# Patient Record
Sex: MALE | Race: WHITE | Employment: OTHER | URBAN - METROPOLITAN AREA
[De-identification: names, ages, dates, MRNs, and addresses within clinical notes are randomized per-mention and may not be internally consistent; named-entity substitution may affect disease eponyms.]

---

## 2017-10-09 ENCOUNTER — HOSPITAL ENCOUNTER (OUTPATIENT)
Dept: NON INVASIVE DIAGNOSTICS | Age: 62
Discharge: HOME | End: 2017-10-09
Attending: EMERGENCY MEDICINE | Admitting: EMERGENCY MEDICINE

## 2017-10-09 DIAGNOSIS — M25.561 ACUTE PAIN OF RIGHT KNEE: ICD-10-CM

## 2017-10-12 ENCOUNTER — OFFICE VISIT (OUTPATIENT)
Dept: ORTHOPEDIC SURGERY | Age: 62
End: 2017-10-12

## 2017-10-12 VITALS
HEIGHT: 70 IN | BODY MASS INDEX: 31.5 KG/M2 | HEART RATE: 85 BPM | DIASTOLIC BLOOD PRESSURE: 88 MMHG | WEIGHT: 220 LBS | SYSTOLIC BLOOD PRESSURE: 138 MMHG

## 2017-10-12 DIAGNOSIS — M65.331 TRIGGER MIDDLE FINGER OF RIGHT HAND: Primary | ICD-10-CM

## 2017-10-12 PROCEDURE — 99203 OFFICE O/P NEW LOW 30 MIN: CPT | Performed by: ORTHOPAEDIC SURGERY

## 2017-10-12 PROCEDURE — 20550 NJX 1 TENDON SHEATH/LIGAMENT: CPT | Performed by: ORTHOPAEDIC SURGERY

## 2017-10-12 RX ORDER — ATENOLOL 50 MG/1
100 TABLET ORAL DAILY
Refills: 1 | COMMUNITY
Start: 2017-07-19

## 2017-10-12 RX ORDER — METOPROLOL SUCCINATE 50 MG/1
TABLET, EXTENDED RELEASE ORAL
Refills: 6 | COMMUNITY
Start: 2017-08-14 | End: 2019-09-30 | Stop reason: ALTCHOICE

## 2017-10-12 RX ORDER — CHLORTHALIDONE 25 MG/1
TABLET ORAL
Refills: 6 | COMMUNITY
Start: 2017-08-14

## 2017-11-06 ENCOUNTER — OFFICE VISIT (OUTPATIENT)
Dept: ORTHOPEDIC SURGERY | Age: 62
End: 2017-11-06

## 2017-11-06 VITALS
BODY MASS INDEX: 31.5 KG/M2 | WEIGHT: 220 LBS | SYSTOLIC BLOOD PRESSURE: 164 MMHG | HEART RATE: 57 BPM | DIASTOLIC BLOOD PRESSURE: 107 MMHG | RESPIRATION RATE: 16 BRPM | HEIGHT: 70 IN

## 2017-11-06 DIAGNOSIS — M25.561 ACUTE PAIN OF RIGHT KNEE: ICD-10-CM

## 2017-11-06 DIAGNOSIS — M17.11 PRIMARY OSTEOARTHRITIS OF RIGHT KNEE: Primary | ICD-10-CM

## 2017-11-06 PROCEDURE — 99213 OFFICE O/P EST LOW 20 MIN: CPT | Performed by: ORTHOPAEDIC SURGERY

## 2017-11-06 NOTE — PROGRESS NOTES
CHIEF COMPLAINT: Right knee pain. History:   Cassie Lieva is a 58 y.o. White male self-referred for evaluation and treatment of right knee pain / injury. The patient complains of right knee pain. He does have remote histor yof bilateral knee surgeries. This is evaluated as a personal injury. There was a history of injury. He jumped up to celebrate a touchdown at a [employer] game and felt a pop. The next day, he had some pain. The pain began 1 month ago. The pain is located medial. He describes the symptoms as aching. He rates pain at 7/10. Symptoms improve with nothing, though he has tried no treatments. The symptoms are worse with stair climbing, kneeling, deep knee bending, getting up from a chair, walking / standing for prolonged periods of time. The knee has not given out or felt unstable. The patient can bend and straighten the knee fully. There is no swelling in the knee. There was not catching / locking of the knee. The patient has not had PT. The patient has not had an injection. The patient has not taken NSAIDs. The patient's occupation is retired / part-time with [de-identified]. Outside reports reviewed: none. Past Medical History:   Diagnosis Date    Hypertension        Past Surgical History:   Procedure Laterality Date    COLON SURGERY      KNEE SURGERY         History reviewed. No pertinent family history.     Social History     Social History    Marital status:      Spouse name: N/A    Number of children: N/A    Years of education: N/A     Social History Main Topics    Smoking status: Former Smoker    Smokeless tobacco: Never Used    Alcohol use Yes    Drug use: No    Sexual activity: Not Asked     Other Topics Concern    None     Social History Narrative    None       Current Outpatient Prescriptions   Medication Sig Dispense Refill    chlorthalidone (HYGROTON) 25 MG tablet TK 1 T PO D  6    metoprolol succinate (TOPROL XL) 50 MG extended release tablet TK 1 T PO D  6 Assessment:     Right knee osteoarthritis      Plan:     Natural history and expected course discussed. Questions answered. Ice prn. NSAIDs prn. Injection prn. Follow up prn.

## 2018-05-11 ENCOUNTER — OFFICE VISIT (OUTPATIENT)
Dept: ORTHOPEDIC SURGERY | Age: 63
End: 2018-05-11

## 2018-05-11 VITALS
WEIGHT: 220 LBS | BODY MASS INDEX: 31.5 KG/M2 | SYSTOLIC BLOOD PRESSURE: 148 MMHG | RESPIRATION RATE: 12 BRPM | DIASTOLIC BLOOD PRESSURE: 96 MMHG | HEIGHT: 70 IN | HEART RATE: 67 BPM

## 2018-05-11 DIAGNOSIS — L03.115 CELLULITIS OF RIGHT KNEE: ICD-10-CM

## 2018-05-11 DIAGNOSIS — M25.561 RIGHT KNEE PAIN, UNSPECIFIED CHRONICITY: Primary | ICD-10-CM

## 2018-05-11 DIAGNOSIS — M17.11 PRIMARY OSTEOARTHRITIS OF RIGHT KNEE: ICD-10-CM

## 2018-05-11 PROCEDURE — 20610 DRAIN/INJ JOINT/BURSA W/O US: CPT | Performed by: PHYSICIAN ASSISTANT

## 2018-05-11 PROCEDURE — 99213 OFFICE O/P EST LOW 20 MIN: CPT | Performed by: PHYSICIAN ASSISTANT

## 2018-05-14 ENCOUNTER — OFFICE VISIT (OUTPATIENT)
Dept: ORTHOPEDIC SURGERY | Age: 63
End: 2018-05-14

## 2018-05-14 VITALS
RESPIRATION RATE: 16 BRPM | HEIGHT: 70 IN | DIASTOLIC BLOOD PRESSURE: 101 MMHG | WEIGHT: 220 LBS | SYSTOLIC BLOOD PRESSURE: 165 MMHG | BODY MASS INDEX: 31.5 KG/M2

## 2018-05-14 DIAGNOSIS — M76.899 QUADRICEPS TENDONITIS: Primary | ICD-10-CM

## 2018-05-14 PROCEDURE — 99214 OFFICE O/P EST MOD 30 MIN: CPT | Performed by: ORTHOPAEDIC SURGERY

## 2018-05-22 ENCOUNTER — HOSPITAL ENCOUNTER (OUTPATIENT)
Dept: OTHER | Age: 63
Discharge: HOME | End: 2018-05-31
Attending: ORTHOPAEDIC SURGERY | Admitting: ORTHOPAEDIC SURGERY

## 2018-05-29 ENCOUNTER — HOSPITAL ENCOUNTER (OUTPATIENT)
Dept: PHYSICAL THERAPY | Age: 63
Discharge: HOME | End: 2018-05-30
Admitting: ORTHOPAEDIC SURGERY

## 2018-05-29 NOTE — FLOWSHEET NOTE
Adduction BS 10 sec x 10  Added 5/22   Prone 10 x 3 #2 ^5/29   SLR+ 10 sec x 10  Added 5/29        Isometrics     Quad sets          Patellar Glides     Medial     Superior     Inferior          ROM     Sheet Pulls     Hang Weights          CKC     Calf raises 10 x 3 Added 5/22   Wall sits     Step ups     1 leg stand 15 sec x 5 Added 5/29   Squatting     CC TKE 10 x 3 4 plates Added 1/72   Balance     bridges          PRE     Extension 10 x 3 #2 ^5/29   Flexion 10 x 3  Added 5/29        Quantum machines     Leg press      Leg extension     Leg curl          Manual interventions                 Therapeutic Exercise and NMR EXR  [x] (84688) Provided verbal/tactile cueing for activities related to strengthening, flexibility, endurance, ROM for improvements in LE, proximal hip, and core control with self care, mobility, lifting, ambulation.  [] (36678) Provided verbal/tactile cueing for activities related to improving balance, coordination, kinesthetic sense, posture, motor skill, proprioception  to assist with LE, proximal hip, and core control in self care, mobility, lifting, ambulation and eccentric single leg control.      NMR and Therapeutic Activities:    [] (34191 or 50441) Provided verbal/tactile cueing for activities related to improving balance, coordination, kinesthetic sense, posture, motor skill, proprioception and motor activation to allow for proper function of core, proximal hip and LE with self care and ADLs  [] (36978) Gait Re-education- Provided training and instruction to the patient for proper LE, core and proximal hip recruitment and positioning and eccentric body weight control with ambulation re-education including up and down stairs     Home Exercise Program:    [x] (07024) Reviewed/Progressed HEP activities related to strengthening, flexibility, endurance, ROM of core, proximal hip and LE for functional self-care, mobility, lifting and ambulation/stair navigation   []

## 2018-06-01 ENCOUNTER — HOSPITAL ENCOUNTER (OUTPATIENT)
Dept: OTHER | Age: 63
Discharge: HOME | End: 2018-06-30
Attending: ORTHOPAEDIC SURGERY | Admitting: ORTHOPAEDIC SURGERY

## 2018-06-05 ENCOUNTER — HOSPITAL ENCOUNTER (OUTPATIENT)
Dept: PHYSICAL THERAPY | Age: 63
Discharge: HOME | End: 2018-06-06
Admitting: ORTHOPAEDIC SURGERY

## 2018-06-05 NOTE — FLOWSHEET NOTE
[address] [facility] [address]  Phone: [phone]   WHV: [phone]    Physical Therapy Daily Treatment Note  Date:  2018    Patient Name:  Yas Torre    :  1955  MRN: [id number]  Restrictions/Precautions:    Medical/Treatment Diagnosis Information:  Diagnosis: Quadriceps tendonitis - M76.899  Treatment Diagnosis: Decreased strength;Decreased high-level IADLs;Decreased endurance; Insurance/Certification information:  PT Insurance Information: Med mutual  Physician Information:  Referring Practitioner: Dr. Dhaliwal Halifax of care signed (Y/N): Y    Date of Patient follow up with Physician:     G-Code (if applicable):      Date G-Code Applied:  18  PT G-Codes  Functional Assessment Tool Used: LEFS  Score: 15% limitation  Functional Limitation: Mobility: Walking and moving around  Mobility: Walking and Moving Around Current Status (): At least 1 percent but less than 20 percent impaired, limited or restricted  Mobility: Walking and Moving Around Goal Status (): 0 percent impaired, limited or restricted    Progress Note: []  Yes  [x]  No  Next due by: 2018     Latex Allergy:  [x]NO      []YES  Preferred Language for Healthcare:   [x]English       []other:    Visit # Insurance Allowable   3 30     Pain level:  0 /10      SUBJECTIVE:  States he has not had any pain except a \"ulysses horse\" in his L knee. He states he is doing his HEP everyday.           OBJECTIVE:   Observation:   Test measurements:      RESTRICTIONS/PRECAUTIONS: none     Exercises/Interventions:   Exercise/Equipment Resistance/Repetitions Other comments   Stretching     Hamstring - supine 30 sec x 5 Added    Towel Pull     Inclined Calf     Hip Flexion     ITB     Groin     Quad - prone 30 sec x 5 Added         SLR     Supine 10 x 3 #4 ^6/5   Abduction 10 x 3 #4 ^6/5   Adduction BS 10 sec x 10  Added    Prone 10 x 3 report working pain free. Progression Towards Functional goals:  [] Patient is progressing as expected towards functional goals listed. [] Progression is slowed due to complexities listed. [] Progression has been slowed due to co-morbidities. [x] Plan just implemented, too soon to assess goals progression  [] Other:     ASSESSMENT:      Treatment/Activity Tolerance:  [x] Patient tolerated treatment well [] Patient limited by fatique  [] Patient limited by pain  [] Patient limited by other medical complications  [] Other: Pt mercedes session. He demonstrated increased strength, noted by increased resistance. He mercedes the addition of leg press, mini squats, and step-ups. He required VCs to decrease anterior tibial translation with mini squats, but then was able to maintain proper form. He did report min pain by the end of the mini squat sets. 6/5    Patient education: Patient education on PT and plan of care including diagnosis, prognosis, treatment goals and options. Patient agrees with discussed POC and treatment and is aware of rehab process.  5/22    Prognosis: [x] Good [] Fair  [] Poor    Patient Requires Follow-up: [x] Yes  [] No    PLAN:   [] Continue per plan of care [] Alter current plan (see comments)  [x] Plan of care initiated [] Hold pending MD visit [] Discharge    Electronically signed by: Amee Parish PT, DPT

## 2018-06-12 ENCOUNTER — HOSPITAL ENCOUNTER (OUTPATIENT)
Dept: PHYSICAL THERAPY | Age: 63
Discharge: HOME | End: 2018-06-13
Admitting: ORTHOPAEDIC SURGERY

## 2018-06-12 NOTE — FLOWSHEET NOTE
[address] [facility] [address]  Phone: [phone]   TXO: [phone]    Physical Therapy Daily Treatment Note  Date:  2018    Patient Name:  Estefany Mays    :  1955  MRN: [id number]  Restrictions/Precautions:    Medical/Treatment Diagnosis Information:  Diagnosis: Quadriceps tendonitis - M76.899  Treatment Diagnosis: Decreased strength;Decreased high-level IADLs;Decreased endurance; Insurance/Certification information:  PT Insurance Information: Med mutual  Physician Information:  Referring Practitioner: Dr. William Neil of care signed (Y/N): Y    Date of Patient follow up with Physician:     G-Code (if applicable):      Date G-Code Applied:  18  PT G-Codes  Functional Assessment Tool Used: LEFS  Score: 15% limitation  Functional Limitation: Mobility: Walking and moving around  Mobility: Walking and Moving Around Current Status (): At least 1 percent but less than 20 percent impaired, limited or restricted  Mobility: Walking and Moving Around Goal Status (): 0 percent impaired, limited or restricted    Progress Note: []  Yes  [x]  No  Next due by: 2018     Latex Allergy:  [x]NO      []YES  Preferred Language for Healthcare:   [x]English       []other:    Visit # Insurance Allowable   4 30     Pain level:  5 /10     SUBJECTIVE:  States he is pretty sore today. He states he worked all weekend and is really sore.         OBJECTIVE:   Observation:   Test measurements:      RESTRICTIONS/PRECAUTIONS: none     Exercises/Interventions:   Exercise/Equipment Resistance/Repetitions Other comments   Stretching     Hamstring - supine 30 sec x 5 Added    Towel Pull     Inclined Calf     Hip Flexion     ITB     Groin     Quad - prone 30 sec x 5 Added         SLR     Supine 10 x 3 #4 ^6/5   Abduction 10 x 3 #4 ^6/5   Adduction BS 10 sec x 10  Added    Prone 10 x 3 #4 ^6/5   SLR+ 10 sec x 10 Added 5/29        Isometrics     Quad sets          ROM     Sheet Pulls     Hang Weights          CKC     Calf raises 10 x 3 Added 5/22   Wall sits     Step ups 1 leg stand Squatting CC TKE 10 x 3 4 plates Added 3/66   Balance     bridges     clamshells 10 x 3 Added 6/12        PRE     Extension 10 x 3 #4 ^6/5   Flexion 10 x 3  #4 ^6/5        Quantum machines     Leg press  10 x 3 #100 Added 6/5   Leg extension     Leg curl          Manual interventions                 Therapeutic Exercise and NMR EXR  [x] (86120) Provided verbal/tactile cueing for activities related to strengthening, flexibility, endurance, ROM for improvements in LE, proximal hip, and core control with self care, mobility, lifting, ambulation.  [] (88111) Provided verbal/tactile cueing for activities related to improving balance, coordination, kinesthetic sense, posture, motor skill, proprioception  to assist with LE, proximal hip, and core control in self care, mobility, lifting, ambulation and eccentric single leg control.      NMR and Therapeutic Activities:    [] (98233 or 50992) Provided verbal/tactile cueing for activities related to improving balance, coordination, kinesthetic sense, posture, motor skill, proprioception and motor activation to allow for proper function of core, proximal hip and LE with self care and ADLs  [] (25080) Gait Re-education- Provided training and instruction to the patient for proper LE, core and proximal hip recruitment and positioning and eccentric body weight control with ambulation re-education including up and down stairs     Home Exercise Program:    [x] (02421) Reviewed/Progressed HEP activities related to strengthening, flexibility, endurance, ROM of core, proximal hip and LE for functional self-care, mobility, lifting and ambulation/stair navigation   [] (33341)Reviewed/Progressed HEP activities related to improving balance, coordination, kinesthetic sense, posture, motor skill, proprioception of core, proximal hip and LE for self care, mobility, lifting, and ambulation/stair navigation      Manual Treatments:  PROM / STM / Oscillations-Mobs:  G-I, II, III, IV (PA's, Inf., Post.)  [] (76277) Provided manual therapy to mobilize LE, proximal hip and/or LS spine soft tissue/joints for the purpose of modulating pain, promoting relaxation,  increasing ROM, reducing/eliminating soft tissue swelling/inflammation/restriction, improving soft tissue extensibility and allowing for proper ROM for normal function with self care, mobility, lifting and ambulation. Modalities:  Declined 5/22    Charges:  Timed Code Treatment Minutes: 40'    Total Treatment Minutes: 48'        [] EVAL (LOW) 33841 (typically 20 minutes face-to-face)  [] EVAL (MOD) 26770 (typically 30 minutes face-to-face)  [] EVAL (HIGH) 53955 (typically 45 minutes face-to-face)  [] RE-EVAL   [x] AJ(56224) x  2   [] IONTO  [] NMR (29161) x      [] VASO  [] Manual (90625) x       [] Other:  [x] TA x       [] Mech Traction (14840)  [] ES(attended) (23655)      [] ES (un) (35769):     GOALS:   Patient stated goal: strength knees     Therapist goals for Patient:   Short Term Goals: To be achieved in: 2 weeks  1. Independent in HEP and progression per patient tolerance, in order to prevent re-injury. 2. Patient will have a decrease in pain to facilitate improvement in movement, function, and ADLs as indicated by Functional Deficits. Long Term Goals: To be achieved in: 6 weeks  1. Disability index score of 7% or less for the LEFS to assist with reaching prior level of function. 2. Patient will demonstrate an increase in right hip (flex, ABD, and ext) and knee (flex and ext) strength to 4+/5 to allow for proper functional mobility as indicated by patients Functional Deficits. 3. Patient will return to walking without increased symptoms or restriction. 4. Patient will report working pain free.      Progression Towards Functional goals:  [] Patient is progressing as expected towards functional goals listed. [] Progression is slowed due to complexities listed. [] Progression has been slowed due to co-morbidities. [x] Plan just implemented, too soon to assess goals progression  [] Other:     ASSESSMENT:      Treatment/Activity Tolerance:  [x] Patient tolerated treatment well [] Patient limited by fatique  [] Patient limited by pain  [] Patient limited by other medical complications  [] Other: Pt mercedes session well. Resistance was not changed today due to pt's soreness. He required min VCs to maintain quad activation with SLRs. He mercedes the addition of clamshells pain free. 6/12    Patient education: Patient education on PT and plan of care including diagnosis, prognosis, treatment goals and options. Patient agrees with discussed POC and treatment and is aware of rehab process.  5/22    Prognosis: [x] Good [] Fair  [] Poor    Patient Requires Follow-up: [x] Yes  [] No    PLAN:   [] Continue per plan of care [] Alter current plan (see comments)  [x] Plan of care initiated [] Hold pending MD visit [] Discharge    Electronically signed by: Makeda Villa PT, DPT

## 2018-06-19 ENCOUNTER — HOSPITAL ENCOUNTER (OUTPATIENT)
Dept: PHYSICAL THERAPY | Age: 63
Discharge: HOME | End: 2018-06-20
Admitting: ORTHOPAEDIC SURGERY

## 2018-06-19 NOTE — PLAN OF CARE
[address] [facility] [place] [address]  Phone: [phone]   YYU: [phone]     Physical Therapy Re-Certification Plan of Care    Dear Dr. Shay Duncan,    We had the pleasure of treating the following patient for physical therapy services at [address] [place] [address]. A summary of our findings can be found in the updated assessment below. This includes our plan of care. If you have any questions or concerns regarding these findings, please do not hesitate to contact me at the office phone number checked above. Thank you for the referral.     Physician Signature:________________________________Date:__________________  By signing above (or electronic signature), therapists plan is approved by physician      Overall Response to Treatment:   [x]Patient is responding well to treatment and improvement is noted with regards  to goals   []Patient should continue to improve in reasonable time if they continue HEP   []Patient has plateaued and is no longer responding to skilled PT intervention    []Patient is getting worse and would benefit from return to referring MD   []Patient unable to adhere to initial POC   [x]Other: Pt demonstrates improved hamstring flexibility, but continues to lack quad flexibility. He demonstrates improved R knee ext ROM and improved hamstring/ quad and hip ABD strength. Recommend pt continue HEP for 2 weeks and return for re-assessment of symptoms after returning to work and possible D/C.      Date range of previous POC Visits: 18 - 18    Total Visits: 5          Physical Therapy Daily Treatment Note  Date:  2018    Patient Name:  Waleska Woods    :  1955  MRN: [id number]  Restrictions/Precautions:    Medical/Treatment Diagnosis Information:  Diagnosis: Quadriceps tendonitis - T06.235  Treatment Diagnosis: Decreased strength;Decreased high-level IADLs;Decreased Added 5/29        Isometrics     Quad sets          CKC     Calf raises 10 x 3 Added 5/22   Wall sits     Step ups 10 x 3 Green + pink flex and lateral Added 6/5   1 leg stand 30 sec x 5 ^6/19   Squatting 10 x 3 Added 6/5   CC TKE 10 x 3 6 plates ^8/10   Balance     bridges     clamshells 10 x 3 Added 6/12        PRE     Extension 10 x 3 #4 ^6/5   Flexion 10 x 3  #4 ^6/5        Quantum machines     Leg press  10 x 3 #130 ^6/19   Leg extension     Leg curl          Manual interventions                 Therapeutic Exercise and NMR EXR  [x] (84502) Provided verbal/tactile cueing for activities related to strengthening, flexibility, endurance, ROM for improvements in LE, proximal hip, and core control with self care, mobility, lifting, ambulation.  [] (18013) Provided verbal/tactile cueing for activities related to improving balance, coordination, kinesthetic sense, posture, motor skill, proprioception  to assist with LE, proximal hip, and core control in self care, mobility, lifting, ambulation and eccentric single leg control.      NMR and Therapeutic Activities:    [] (69494 or 48355) Provided verbal/tactile cueing for activities related to improving balance, coordination, kinesthetic sense, posture, motor skill, proprioception and motor activation to allow for proper function of core, proximal hip and LE with self care and ADLs  [] (07358) Gait Re-education- Provided training and instruction to the patient for proper LE, core and proximal hip recruitment and positioning and eccentric body weight control with ambulation re-education including up and down stairs     Home Exercise Program:    [x] (09552) Reviewed/Progressed HEP activities related to strengthening, flexibility, endurance, ROM of core, proximal hip and LE for functional self-care, mobility, lifting and ambulation/stair navigation   [] (68429)Reviewed/Progressed HEP activities related to improving balance, coordination, kinesthetic sense, posture, motor Patient will report working pain free. - Progressing towards    Progression Towards Functional goals:  [x] Patient is progressing as expected towards functional goals listed. [] Progression is slowed due to complexities listed. [] Progression has been slowed due to co-morbidities. [] Plan just implemented, too soon to assess goals progression  [] Other:     ASSESSMENT:      Treatment/Activity Tolerance:  [x] Patient tolerated treatment well [] Patient limited by fatique  [] Patient limited by pain  [] Patient limited by other medical complications  [] Other: Pt demonstrates improved hamstring flexibility, but continues to lack quad flexibility. He demonstrates improved R knee ext ROM and improved hamstring/ quad and hip ABD strength. Recommend pt continue HEP for 2 weeks and return for re-assessment of symptoms after returning to work and possible D/C.   6/19    Patient education: Patient education on PT and plan of care including diagnosis, prognosis, treatment goals and options. Patient agrees with discussed POC and treatment and is aware of rehab process.  5/22    Prognosis: [x] Good [] Fair  [] Poor    Patient Requires Follow-up: [x] Yes  [] No    PLAN: 1x/ every other week for 4 weeks (6/19 - 7/17)  [x] Continue per plan of care [] Alter current plan (see comments)  [] Plan of care initiated [] Hold pending MD visit [] Discharge    Electronically signed by: Lay Lynn PT, DPT

## 2018-07-01 ENCOUNTER — HOSPITAL ENCOUNTER (OUTPATIENT)
Dept: OTHER | Age: 63
Discharge: HOME | End: 2018-07-31
Attending: ORTHOPAEDIC SURGERY | Admitting: ORTHOPAEDIC SURGERY

## 2018-07-02 ENCOUNTER — TELEPHONE (OUTPATIENT)
Dept: PHYSICAL THERAPY | Age: 63
End: 2018-07-02

## 2018-07-03 ENCOUNTER — TELEPHONE (OUTPATIENT)
Dept: PHYSICAL THERAPY | Age: 63
End: 2018-07-03

## 2018-07-13 ENCOUNTER — SURG/PROC ORDERS (OUTPATIENT)
Dept: ANESTHESIOLOGY | Age: 63
End: 2018-07-13

## 2018-07-13 RX ORDER — SODIUM CHLORIDE 0.9 % (FLUSH) 0.9 %
10 SYRINGE (ML) INJECTION EVERY 12 HOURS SCHEDULED
Status: CANCELLED | OUTPATIENT
Start: 2018-07-13 | End: 2019-07-13

## 2018-07-13 RX ORDER — SODIUM CHLORIDE 0.9 % (FLUSH) 0.9 %
10 SYRINGE (ML) INJECTION PRN
Status: CANCELLED | OUTPATIENT
Start: 2018-07-13 | End: 2019-07-13

## 2018-07-13 RX ORDER — SODIUM CHLORIDE 9 MG/ML
INJECTION, SOLUTION INTRAVENOUS CONTINUOUS
Status: CANCELLED | OUTPATIENT
Start: 2018-07-13 | End: 2019-07-13

## 2018-07-16 ENCOUNTER — HOSPITAL ENCOUNTER (OUTPATIENT)
Dept: ENDOSCOPY | Age: 63
Discharge: HOME | End: 2018-07-16
Attending: INTERNAL MEDICINE | Admitting: INTERNAL MEDICINE

## 2018-07-16 VITALS
HEIGHT: 70 IN | RESPIRATION RATE: 16 BRPM | BODY MASS INDEX: 31.88 KG/M2 | DIASTOLIC BLOOD PRESSURE: 76 MMHG | SYSTOLIC BLOOD PRESSURE: 126 MMHG | OXYGEN SATURATION: 97 % | HEART RATE: 55 BPM | WEIGHT: 222.66 LBS | TEMPERATURE: 97.2 F

## 2018-07-16 LAB
ANION GAP SERPL CALCULATED.3IONS-SCNC: 13 MMOL/L (ref 3–16)
BUN BLDV-MCNC: 17 MG/DL (ref 7–20)
CALCIUM SERPL-MCNC: 9.4 MG/DL (ref 8.3–10.6)
CHLORIDE BLD-SCNC: 98 MMOL/L (ref 99–110)
CO2: 30 MMOL/L (ref 21–32)
CREAT SERPL-MCNC: 1 MG/DL (ref 0.8–1.3)
GFR AFRICAN AMERICAN: >60
GFR NON-AFRICAN AMERICAN: >60
GLUCOSE BLD-MCNC: 132 MG/DL (ref 70–99)
POTASSIUM REFLEX MAGNESIUM: 3.7 MMOL/L (ref 3.5–5.1)
SODIUM BLD-SCNC: 141 MMOL/L (ref 136–145)

## 2018-07-16 PROCEDURE — 93010 ELECTROCARDIOGRAM REPORT: CPT | Performed by: INTERNAL MEDICINE

## 2018-07-16 RX ORDER — ONDANSETRON 2 MG/ML
4 INJECTION INTRAMUSCULAR; INTRAVENOUS
Status: ACTIVE | OUTPATIENT
Start: 2018-07-16 | End: 2018-07-16

## 2018-07-16 RX ORDER — SODIUM CHLORIDE 0.9 % (FLUSH) 0.9 %
10 SYRINGE (ML) INJECTION PRN
Status: DISCONTINUED | OUTPATIENT
Start: 2018-07-16 | End: 2018-07-17 | Stop reason: HOSPADM

## 2018-07-16 RX ORDER — PROMETHAZINE HYDROCHLORIDE 25 MG/ML
6.25 INJECTION, SOLUTION INTRAMUSCULAR; INTRAVENOUS
Status: ACTIVE | OUTPATIENT
Start: 2018-07-16 | End: 2018-07-16

## 2018-07-16 RX ORDER — SODIUM CHLORIDE 0.9 % (FLUSH) 0.9 %
10 SYRINGE (ML) INJECTION EVERY 12 HOURS SCHEDULED
Status: DISCONTINUED | OUTPATIENT
Start: 2018-07-16 | End: 2018-07-17 | Stop reason: HOSPADM

## 2018-07-16 RX ORDER — LABETALOL HYDROCHLORIDE 5 MG/ML
5 INJECTION, SOLUTION INTRAVENOUS EVERY 10 MIN PRN
Status: DISCONTINUED | OUTPATIENT
Start: 2018-07-16 | End: 2018-07-17 | Stop reason: HOSPADM

## 2018-07-16 RX ORDER — SODIUM CHLORIDE 9 MG/ML
INJECTION, SOLUTION INTRAVENOUS CONTINUOUS
Status: DISCONTINUED | OUTPATIENT
Start: 2018-07-16 | End: 2018-07-17 | Stop reason: HOSPADM

## 2018-07-16 RX ADMIN — SODIUM CHLORIDE: 9 INJECTION, SOLUTION INTRAVENOUS at 08:02

## 2018-07-16 ASSESSMENT — PAIN - FUNCTIONAL ASSESSMENT: PAIN_FUNCTIONAL_ASSESSMENT: 0-10

## 2018-07-16 ASSESSMENT — PAIN SCALES - GENERAL: PAINLEVEL_OUTOF10: 0

## 2018-07-16 NOTE — ANESTHESIA PRE-OP
[facility] Department of Anesthesiology  Pre-Anesthesia Evaluation/Consultation       Name:  Waleska Woods  : 1955  Age:  61 y.o. MRN:  [id number]  Date: 2018           Procedure (Scheduled):  Colonoscopy  Surgeon:  Dr. Ana Mcwilliams     No Known Allergies  Patient Active Problem List   Diagnosis    Polyp of sigmoid colon    Colon cancer ([address]    Trigger middle finger of right hand    Primary osteoarthritis of right knee     Past Medical History:   Diagnosis Date    Hypertension      Past Surgical History:   Procedure Laterality Date    COLON SURGERY      KNEE SURGERY       Social History   Substance Use Topics    Smoking status: Former Smoker    Smokeless tobacco: Never Used    Alcohol use Yes     Medications  Current Outpatient Prescriptions on File Prior to Encounter   Medication Sig Dispense Refill    chlorthalidone (HYGROTON) 25 MG tablet TK 1 T PO D  6    metoprolol succinate (TOPROL XL) 50 MG extended release tablet TK 1 T PO D  6    atenolol (TENORMIN) 25 MG tablet TK 4 TS PO D  1    atenolol (TENORMIN) 50 MG tablet        No current facility-administered medications on file prior to encounter.       Current Outpatient Prescriptions   Medication Sig Dispense Refill    chlorthalidone (HYGROTON) 25 MG tablet TK 1 T PO D  6    metoprolol succinate (TOPROL XL) 50 MG extended release tablet TK 1 T PO D  6    atenolol (TENORMIN) 25 MG tablet TK 4 TS PO D  1    atenolol (TENORMIN) 50 MG tablet        Current Facility-Administered Medications   Medication Dose Route Frequency Provider Last Rate Last Dose    0.9 % sodium chloride infusion   Intravenous Continuous Kyara Dwyer MD        famotidine (PEPCID) injection 20 mg  20 mg Intravenous Once Kyara Dwyer MD        sodium chloride flush 0.9 % injection 10 mL  10 mL Intravenous 2 times per day Kyara Dwyer MD        sodium chloride flush 0.9 % injection 10 Pulmonary:Negative Pulmonary ROS and normal exam                               Cardiovascular:  Exercise tolerance: good (>4 METS),   (+) hypertension:,       ECG reviewed  Rhythm: regular  Rate: normal           Beta Blocker:  Dose within 24 Hrs         Neuro/Psych:   Negative Neuro/Psych ROS              GI/Hepatic/Renal:   (+) bowel prep,           Endo/Other: Negative Endo/Other ROS                    Abdominal:   (+) obese,         Vascular: negative vascular ROS. Anesthesia Plan      MAC     ASA 2       Induction: intravenous. Anesthetic plan and risks discussed with patient and spouse. Plan discussed with CRNA. This pre-anesthesia assessment may be used as a history and physical.    DOS STAFF ADDENDUM:    Pt seen and examined, chart reviewed (including anesthesia, drug and allergy history). No interval changes to history and physical examination. Anesthetic plan, risks, benefits, alternatives, and personnel involved discussed with patient. Patient verbalized an understanding and agrees to proceed.       Zeny Houston MD  July 16, 2018  7:52 AM

## 2018-07-16 NOTE — OP NOTE
Careful circumferential examination of the mucosa in these areas demonstrated:    1. A 3 mm polyp on the IC valve removed completely with biopsy polypectomy. 2. A 3 mm polyp in the ascending colon removed completely with biopsy polypectomy. 3. A 3 mm polyp in the ascending colon removed completely with biopsy polypectomy. 4. A tattoo was present at 70 cm without any evidence of residual polyp. 5. A healthy appearing anastomosis was present at 20 cm without any stricturing or ulceration. The scope was then withdrawn into the rectum and retroflexed. The retroflexed view of the anal verge and rectum demonstrates normal rectum. The scope was straightened, the colon was decompressed and the scope was withdrawn from the patient. The patient tolerated the procedure well and was taken to the PACU in good condition. Estimated blood loss: < 5 CC. Impression:  See post-procedure diagnoses. Recommendations:  1. Await pathology results. 2. Recommend repeat Colonoscopy in 3 years for surveillance purposes. 3. The patient had biopsies taken today. The patient should call for results in 7 days if they have not heard from our office. Our number is [phone].     KARI Perez [address] and [address]  7/16/2018  [phone]

## 2018-07-16 NOTE — ANESTHESIA POST-OP
[facility] Department of Anesthesiology  Post-Anesthesia Note       Name:  Estefany Mays                                  Age:  61 y.o. MRN:  [id number]     Last Vitals & Oxygen Saturation: /76   Pulse 55   Temp 97.2 °F (36.2 °C) (Temporal)   Resp 16   Ht 5' 10\" (1.778 m)   Wt 222 lb 10.6 oz (101 kg)   SpO2 97%   BMI 31.95 kg/m²   No data found. Level of consciousness:  Awake, alert to baseline    Respiratory: Respirations easy, no distress. Stable. Cardiovascular: Hemodynamically stable. Hydration: Adequate. PONV: Adequately managed. Post-op pain: Adequately controlled. Post-op assessment: Tolerated anesthetic well without complication. Complications:  None.     Yousif Barron MD  July 16, 2018   3:57 PM

## 2018-07-17 LAB
EKG ATRIAL RATE: 56 BPM
EKG DIAGNOSIS: NORMAL
EKG P AXIS: 45 DEGREES
EKG P-R INTERVAL: 180 MS
EKG Q-T INTERVAL: 450 MS
EKG QRS DURATION: 84 MS
EKG QTC CALCULATION (BAZETT): 434 MS
EKG R AXIS: 39 DEGREES
EKG T AXIS: 35 DEGREES
EKG VENTRICULAR RATE: 56 BPM

## 2019-06-18 ENCOUNTER — OFFICE VISIT (OUTPATIENT)
Dept: ORTHOPEDIC SURGERY | Age: 64
End: 2019-06-18
Payer: COMMERCIAL

## 2019-06-18 VITALS
BODY MASS INDEX: 31.5 KG/M2 | WEIGHT: 220 LBS | SYSTOLIC BLOOD PRESSURE: 140 MMHG | DIASTOLIC BLOOD PRESSURE: 90 MMHG | RESPIRATION RATE: 16 BRPM | HEIGHT: 70 IN

## 2019-06-18 DIAGNOSIS — M25.562 LEFT KNEE PAIN, UNSPECIFIED CHRONICITY: Primary | ICD-10-CM

## 2019-06-18 PROCEDURE — 99214 OFFICE O/P EST MOD 30 MIN: CPT | Performed by: ORTHOPAEDIC SURGERY

## 2019-06-18 NOTE — PROGRESS NOTES
CHIEF COMPLAINT: Left knee pain. History:   Josue Ford is a 59 y.o. male self-referred for evaluation and treatment of left knee pain / injury. The patient complains of left knee pain. This is evaluated as a personal injury. There was not a history of injury. The pain began 6 months ago. The pain is located medial. He describes the symptoms as aching. He rates pain at 10/10. Symptoms improve with nothing, though he has tried no treatments. The symptoms are worse with standing, walking for long time. The knee has not given out or felt unstable. The patient can bend and straighten the knee fully. There is no swelling in the knee. There was not catching / locking of the knee. The patient has not had PT. The patient has not had an injection. The patient has not taken NSAIDs. The patient has not tried ice. 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 Outside reports reviewed: none. Past Medical History:   Diagnosis Date    Hypertension        Past Surgical History:   Procedure Laterality Date    COLON SURGERY      COLONOSCOPY  07/2018    Dr. Donovan Pettit         History reviewed. No pertinent family history. Social History     Socioeconomic History    Marital status:      Spouse name: None    Number of children: None    Years of education: None    Highest education level: None   Occupational History    None   Social Needs    Financial resource strain: None    Food insecurity:     Worry: None     Inability: None    Transportation needs:     Medical: None     Non-medical: None   Tobacco Use    Smoking status: Former Smoker    Smokeless tobacco: Never Used   Substance and Sexual Activity    Alcohol use:  Yes    Drug use: No    Sexual activity: None   Lifestyle    Physical activity:     Days per week: None     Minutes per session: None    Stress: None   Relationships    Social connections:     Talks on phone: None     Gets together: None     Attends Jain service: None     Active member of club or organization: None     Attends meetings of clubs or organizations: None     Relationship status: None    Intimate partner violence:     Fear of current or ex partner: None     Emotionally abused: None     Physically abused: None     Forced sexual activity: None   Other Topics Concern    None   Social History Narrative    None       Current Outpatient Medications   Medication Sig Dispense Refill    chlorthalidone (HYGROTON) 25 MG tablet TK 1 T PO D  6    metoprolol succinate (TOPROL XL) 50 MG extended release tablet TK 1 T PO D  6    atenolol (TENORMIN) 25 MG tablet TK 4 TS PO D  1    atenolol (TENORMIN) 50 MG tablet        No current facility-administered medications for this visit. No Known Allergies    Review of Systems:  Pertinent items are noted in HPI. 13 point review of systems from Patient History Form dated 6/18/19 and available in the patient's chart under the Media tab. Physical Examination:     Vital signs:   BP (!) 156/81   Resp 16   Ht 5' 10\" (1.778 m)   Wt 220 lb (99.8 kg)   BMI 31.57 kg/m²    General:  alert, appears stated age, cooperative and no distress   Gait:  Normal. The patient can bear weight on the injured extremity. Left Knee  Alignment:  neutral   ROM:  0 degrees extension to 120 degrees flexion   Bilateral knees   Crepitus:  patellar   Joint Tenderness:  medial joint line   Effusion:   0 cc   Patellar excursion:  2 of 4 quadrants    Patellar tilt test:  positive   Patellar facet tenderness:  positive medial   negative lateral   Trochlear tenderness:  negative   Anterior drawer test:  negative   Posterior drawer:   negative   Varus laxity at 30 degrees:  negative   Right knee: negative   Valgus laxity at 30 degrees:   negative   Right knee: negative     There is not any cellulitis, lymphedema or cutaneous lesions noted in the lower extremities.  Motor exam of the lower extremities show quadriceps, hamstrings, foot dorsiflexion and plantarflexion grossly intact. Sensation to both feet is grossly intact to light touch. The bilateral lower extremities are warm and well-perfused with brisk capillary refill. Imaging:  Left Knee X-Ray: 3 view x-rays of the knee, including bilateral AP and sunrise and lateral of the affected side were obtained and reviewed. No fracture. Severe medial compartment narrowing. Mild to moderate patellofemoral narrowing      Assessment:     Left knee osteoarthritis      Plan:     Natural history and expected course discussed. Questions answered. Ice prn. NSAIDs prn. The risks and benefits of an injection were discussed with the patient. The patient had full opportunity to ask questions and all were answered. The patient then provided verbal informed consent. The skin was then prepped with betadine solution and alcohol. Under aseptic conditions, the  left knee was injected with 4cc of 1% xylocaine, 4cc of 0.25% marcaine, and 1cc of Kenalog (40mg/ml). There were no immediate complications following the injection. The patient was advised of the possibility of injection site reaction and instructed to apply ice to the area and take NSAIDs if able. Follow up 3 months prn.

## 2019-08-05 ENCOUNTER — OFFICE VISIT (OUTPATIENT)
Dept: ORTHOPEDIC SURGERY | Age: 64
End: 2019-08-05
Payer: COMMERCIAL

## 2019-08-05 VITALS
BODY MASS INDEX: 31.5 KG/M2 | HEIGHT: 70 IN | DIASTOLIC BLOOD PRESSURE: 82 MMHG | TEMPERATURE: 97.7 F | WEIGHT: 220 LBS | SYSTOLIC BLOOD PRESSURE: 158 MMHG | HEART RATE: 60 BPM

## 2019-08-05 DIAGNOSIS — M17.12 ARTHRITIS OF LEFT KNEE: ICD-10-CM

## 2019-08-05 DIAGNOSIS — M17.12 ARTHRITIS OF LEFT KNEE: Primary | ICD-10-CM

## 2019-08-05 LAB
ALBUMIN SERPL-MCNC: 4.5 G/DL (ref 3.4–5)
ANION GAP SERPL CALCULATED.3IONS-SCNC: 14 MMOL/L (ref 3–16)
APTT: 30.7 SEC (ref 26–36)
BASOPHILS ABSOLUTE: 0.1 K/UL (ref 0–0.2)
BASOPHILS RELATIVE PERCENT: 1.2 %
BILIRUBIN URINE: NEGATIVE
BLOOD, URINE: ABNORMAL
BUN BLDV-MCNC: 17 MG/DL (ref 7–20)
CALCIUM SERPL-MCNC: 9.6 MG/DL (ref 8.3–10.6)
CHLORIDE BLD-SCNC: 96 MMOL/L (ref 99–110)
CLARITY: CLEAR
CO2: 30 MMOL/L (ref 21–32)
COLOR: YELLOW
CREAT SERPL-MCNC: 1 MG/DL (ref 0.8–1.3)
EOSINOPHILS ABSOLUTE: 0.4 K/UL (ref 0–0.6)
EOSINOPHILS RELATIVE PERCENT: 3 %
EPITHELIAL CELLS, UA: 0 /HPF (ref 0–5)
ESTIMATED AVERAGE GLUCOSE: 128.4 MG/DL
GFR AFRICAN AMERICAN: >60
GFR NON-AFRICAN AMERICAN: >60
GLUCOSE BLD-MCNC: 126 MG/DL (ref 70–99)
GLUCOSE URINE: NEGATIVE MG/DL
HBA1C MFR BLD: 6.1 %
HCT VFR BLD CALC: 45.7 % (ref 40.5–52.5)
HEMOGLOBIN: 16.1 G/DL (ref 13.5–17.5)
HYALINE CASTS: 0 /LPF (ref 0–8)
INR BLD: 1.07 (ref 0.86–1.14)
KETONES, URINE: NEGATIVE MG/DL
LEUKOCYTE ESTERASE, URINE: NEGATIVE
LYMPHOCYTES ABSOLUTE: 1.8 K/UL (ref 1–5.1)
LYMPHOCYTES RELATIVE PERCENT: 14.9 %
MCH RBC QN AUTO: 30.1 PG (ref 26–34)
MCHC RBC AUTO-ENTMCNC: 35.2 G/DL (ref 31–36)
MCV RBC AUTO: 85.4 FL (ref 80–100)
MICROSCOPIC EXAMINATION: YES
MONOCYTES ABSOLUTE: 1.1 K/UL (ref 0–1.3)
MONOCYTES RELATIVE PERCENT: 9.1 %
NEUTROPHILS ABSOLUTE: 8.5 K/UL (ref 1.7–7.7)
NEUTROPHILS RELATIVE PERCENT: 71.8 %
NITRITE, URINE: NEGATIVE
PDW BLD-RTO: 14.2 % (ref 12.4–15.4)
PH UA: 6.5 (ref 5–8)
PLATELET # BLD: 320 K/UL (ref 135–450)
PMV BLD AUTO: 7.8 FL (ref 5–10.5)
POTASSIUM SERPL-SCNC: 4 MMOL/L (ref 3.5–5.1)
PROTEIN UA: NEGATIVE MG/DL
PROTHROMBIN TIME: 12.2 SEC (ref 9.8–13)
RBC # BLD: 5.35 M/UL (ref 4.2–5.9)
RBC UA: 4 /HPF (ref 0–4)
SODIUM BLD-SCNC: 140 MMOL/L (ref 136–145)
SPECIFIC GRAVITY UA: 1.02 (ref 1–1.03)
TRANSFERRIN: 275 MG/DL (ref 200–360)
URINE REFLEX TO CULTURE: ABNORMAL
URINE TYPE: ABNORMAL
UROBILINOGEN, URINE: 1 E.U./DL
VITAMIN D 25-HYDROXY: 25.3 NG/ML
WBC # BLD: 11.9 K/UL (ref 4–11)
WBC UA: 0 /HPF (ref 0–5)

## 2019-08-05 PROCEDURE — 99214 OFFICE O/P EST MOD 30 MIN: CPT | Performed by: ORTHOPAEDIC SURGERY

## 2019-08-06 ENCOUNTER — HOSPITAL ENCOUNTER (OUTPATIENT)
Dept: PHYSICAL THERAPY | Age: 64
Setting detail: THERAPIES SERIES
Discharge: HOME | End: 2019-08-06
Payer: COMMERCIAL

## 2019-08-06 ENCOUNTER — TELEPHONE (OUTPATIENT)
Dept: ORTHOPEDIC SURGERY | Age: 64
End: 2019-08-06

## 2019-08-06 PROCEDURE — 97161 PT EVAL LOW COMPLEX 20 MIN: CPT | Performed by: PHYSICAL THERAPIST

## 2019-08-06 PROCEDURE — 97110 THERAPEUTIC EXERCISES: CPT | Performed by: PHYSICAL THERAPIST

## 2019-08-06 NOTE — PLAN OF CARE
(G60.9)     Pulmonary conditions   []Asthma (J45)  []Coughing   []COPD (J44.9)   Psychological Disorders  []Anxiety (F41.9)  []Depression (F32.9)   []Other:   []Other:          Barriers to/and or personal factors that will affect rehab potential:              []Age  []Sex              []Motivation/Lack of Motivation                        [x]Co-Morbidities              []Cognitive Function, education/learning barriers              []Environmental, home barriers              []profession/work barriers  []past PT/medical experience  []other:  Justification: Healing process may be delayed due to HTN and prior usage of tobacco.     Falls Risk Assessment (30 days):   [x] Falls Risk assessed and no intervention required.   [] Falls Risk assessed and Patient requires intervention due to being higher risk   TUG score (>12s at risk):     [] Falls education provided, including     ASSESSMENT:   Functional Impairments:     []Noted lumbar/proximal hip/LE joint hypomobility  []Decreased LE functional ROM   [x]Decreased core/proximal hip strength and neuromuscular control   [x]Decreased LE functional strength   []Reduced balance/proprioceptive control   []other:      Functional Activity Limitations (from functional questionnaire and intake)   [x]Reduced ability to tolerate prolonged functional positions   [x]Reduced ability or difficulty with changes of positions or transfers between positions   [x]Reduced ability to maintain good posture and demonstrate good body mechanics with sitting, bending, and lifting   []Reduced ability to sleep   []Reduced ability or tolerance with driving and/or computer work   [x]Reduced ability to perform lifting, carrying tasks   [x]Reduced ability to squat   []Reduced ability to forward bend   []Reduced ability to ambulate prolonged functional periods/distances/surfaces   [x]Reduced ability to ascend/descend stairs   [x]Reduced ability to run, hop, cut or jump   []other:    Participation

## 2019-08-06 NOTE — FLOWSHEET NOTE
[address] [facility] [address]                                                         Physical Therapy Daily Treatment Note  Date:  2019    Patient Name:  Martha Gibbs    :  1955  MRN: [id number]    Medical/Treatment Diagnosis Information:  · Diagnosis: M17.12 (ICD-10-CM) - Arthritis of left knee  · Treatment Diagnosis: M25.562 pain in left knee  Insurance/Certification information:  PT Insurance Information: Medical [place]  Physician Information:  Referring Practitioner: Amparo Cabot of [place] signed (Y/N):     Date of Patient follow up with Physician: L knee replacement 2 Oct 2019    Functional Scale:  2019   WOMAC: 47.9%    Progress Note: [x]  Yes   []   No  Next due by:  To do after surgery around 2 Oct 2019      Latex Allergy:  [x]  NO      []  YES  Preferred Language for Healthcare:   [x]English       []other:    Visit # Insurance Allowable   1 BMN     Pain level:  See eval     SUBJECTIVE:  See eval    OBJECTIVE: See eval   Observation:    Test measurements:      Flexibility L R Comment   Hamstring      Gastroc      ITB      Quad                ROM PROM AROM Overpressure Comment    L R L R L R    Flexion          Extension                                  Strength L R Comment   Quad      Hamstring      Gastroc      Hip flexor      Hip ABD                      Special Test Results/Comment   Meniscal Click    Crepitus    Flexion Test    Valgus Laxity    Varus Laxity    Lachmans    Drop Back    Homans            Girth L R   Mid Patella     Suprapatellar     5cm above     15cm above       Wells Clinical Decision Rule for DVT    Clinical Presentation  Possible Score  Clients Score    Active cancer (within 6 months of diagnosis or receiving palliative care)  1     Paralysis, paresis, or recent immobilization of lower extremity  1     Bedridden for more than 3 days or major surgery in the last 4 weeks  1 Localized tenderness in the center of the posterior calf, the popliteal space, or along the femoral vein in the anterior thigh/groin  1     Entire lower extremity swelling  1     Unilateral calf swelling (more than 3 cm larger than uninvolved side)  1     Unilateral pitting edema  1     Collateral superficial veins (nonvaricose)  1     An alternative diagnosis is as likely (or more likely) than DVT (e.g., cellulitis, postoperative swelling, calf strain)  -2     Total Points   -2     Key  · -2 to 0 Low probability of DVT 3% (95% confidence interval [CI] 1.7%-5.9%)  · 1 to 2 Moderate probability of DVT 17% (95% confidence interval [CI] 12%-23%)  · 3 or more High probability of DVT 75% (95% confidence interval [CI] 63%-84%)    Medical consultation is advised in the presence of low probability  Medical referral is required with moderate or high score. Kristian PS, Kingsley DR, Elsi J, et al: Value of assessment of pretest probability of deep-vein thrombosis in clinical management, Lancet 533:8441-8136, 1997.       RESTRICTIONS/PRECAUTIONS: HTN, history of colon cancer    Exercises/Interventions:     Exercise/Equipment Resistance/Repetitions Other comments   Stretching     Hamstring 10x:10    Hip Flexion     ITB     Grion     Quad     Inclined Calf     Towel Pull 5x:30         SLR     Supine 2x10    Prone x10    Abduction 2x10    Adducton x10    SLR+     Clams x10                   Isometrics     Quad sets 10x:10 propped   Hip Adduction     Hamstring                    Patellar Glides     Medial     Superior     Inferior          ROM     Sheet Pulls 10x:10    Wall Slides     Edge of bed     Flexionator     Extensionator     Hang Weights     Ankle Pumps                              CKC     Calf raises     Wall sits     Step ups     Step up and over     Lateral Step Downs               Mini squats     CC TKE          Lateral band walks     Side Planks     Half moon     Single leg flow          Biodex-balance

## 2019-08-15 ENCOUNTER — TELEPHONE (OUTPATIENT)
Dept: ORTHOPEDIC SURGERY | Age: 64
End: 2019-08-15

## 2019-08-15 DIAGNOSIS — M17.12 ARTHRITIS OF LEFT KNEE: Primary | ICD-10-CM

## 2019-08-15 PROCEDURE — MISCCOLD COLD THERAPY UNIT AND PAD: Performed by: ORTHOPAEDIC SURGERY

## 2019-08-17 NOTE — PROGRESS NOTES
to the left lower extremity. There is no numbness or tingling noted in the left lower extremity. Deep tendon reflexes are 0 to need 0 at the ankle of the left lower extremity. No other obvious cutaneous lesions lymphedema or cellulitic processes are noted in the left lower extremity. Examination of the left knee shows a mild effusion medial joint line tenderness to palpation -5 degrees of full extension to 130 degrees of flexion noted. There are no signs of instability or deep sepsis noted in the left knee. X-rays obtained in the past shows bilateral medial degenerative tricompartmental osteoarthritis. Impression 49-year-old male with Aliscylerenetta Mayern degenerative arthritis of the left knee. This patient has been treated conservatively and now wants to move towards total knee arthroplasty. We had a 35 minute face-to-face discussion of which greater than 50% of the time was spent in counseling with this patient concerning left robotic assisted total knee arthroplasty it's preoperative evaluation and its postoperative pain management and follow-up. We went over the surgical procedure risks and complications including bleeding, infection,  neurovascular injury, decreased range of motion, persistent pain, instability, DVT, pulmonary embolism, leg length inequality, change in mental status, and need for further surgical procedures. The patient understands this and we have answered all of their questions and concerns. We will start his preoperative evaluation and optimization and schedule after 9/18/2019. Follow-up in a preoperative shared medical appointment.

## 2019-08-20 ENCOUNTER — HOSPITAL ENCOUNTER (OUTPATIENT)
Dept: CT IMAGING | Age: 64
Discharge: HOME | End: 2019-08-20
Payer: COMMERCIAL

## 2019-08-20 DIAGNOSIS — M17.12 ARTHRITIS OF LEFT KNEE: ICD-10-CM

## 2019-08-20 PROCEDURE — 73700 CT LOWER EXTREMITY W/O DYE: CPT

## 2019-09-13 ENCOUNTER — TELEPHONE (OUTPATIENT)
Dept: ORTHOPEDICS UNIT | Age: 64
End: 2019-09-13

## 2019-09-16 ENCOUNTER — PREP FOR PROCEDURE (OUTPATIENT)
Dept: ORTHOPEDICS UNIT | Age: 64
End: 2019-09-16

## 2019-09-16 RX ORDER — OXYCODONE HYDROCHLORIDE 5 MG/1
10 TABLET ORAL ONCE
Status: CANCELLED | OUTPATIENT
Start: 2019-09-16 | End: 2019-09-16

## 2019-09-16 RX ORDER — CELECOXIB 200 MG/1
200 CAPSULE ORAL ONCE
Status: CANCELLED | OUTPATIENT
Start: 2019-09-16 | End: 2019-09-16

## 2019-09-23 ENCOUNTER — PREP FOR PROCEDURE (OUTPATIENT)
Dept: ORTHOPEDIC SURGERY | Age: 64
End: 2019-09-23

## 2019-09-23 DIAGNOSIS — M17.12 ARTHRITIS OF LEFT KNEE: Primary | ICD-10-CM

## 2019-09-25 ENCOUNTER — HOSPITAL ENCOUNTER (OUTPATIENT)
Dept: PREADMISSION TESTING | Age: 64
Discharge: HOME | End: 2019-09-29
Payer: COMMERCIAL

## 2019-09-25 DIAGNOSIS — M17.12 ARTHRITIS OF LEFT KNEE: ICD-10-CM

## 2019-09-25 LAB
ABO/RH: NORMAL
ALBUMIN SERPL-MCNC: 4.4 G/DL (ref 3.4–5)
ANION GAP SERPL CALCULATED.3IONS-SCNC: 15 MMOL/L (ref 3–16)
ANTIBODY SCREEN: NORMAL
APTT: 33 SEC (ref 26–36)
BASOPHILS ABSOLUTE: 0.1 K/UL (ref 0–0.2)
BASOPHILS RELATIVE PERCENT: 1.1 %
BILIRUBIN URINE: NEGATIVE
BLOOD, URINE: NEGATIVE
BUN BLDV-MCNC: 19 MG/DL (ref 7–20)
CALCIUM SERPL-MCNC: 9.8 MG/DL (ref 8.3–10.6)
CHLORIDE BLD-SCNC: 97 MMOL/L (ref 99–110)
CLARITY: CLEAR
CO2: 28 MMOL/L (ref 21–32)
COLOR: YELLOW
CREAT SERPL-MCNC: 1 MG/DL (ref 0.8–1.3)
EKG ATRIAL RATE: 52 BPM
EKG DIAGNOSIS: NORMAL
EKG P AXIS: 51 DEGREES
EKG P-R INTERVAL: 188 MS
EKG Q-T INTERVAL: 450 MS
EKG QRS DURATION: 86 MS
EKG QTC CALCULATION (BAZETT): 418 MS
EKG R AXIS: 46 DEGREES
EKG T AXIS: 32 DEGREES
EKG VENTRICULAR RATE: 52 BPM
EOSINOPHILS ABSOLUTE: 0.3 K/UL (ref 0–0.6)
EOSINOPHILS RELATIVE PERCENT: 2 %
GFR AFRICAN AMERICAN: >60
GFR NON-AFRICAN AMERICAN: >60
GLUCOSE BLD-MCNC: 119 MG/DL (ref 70–99)
GLUCOSE URINE: NEGATIVE MG/DL
HCT VFR BLD CALC: 46 % (ref 40.5–52.5)
HEMOGLOBIN: 16.1 G/DL (ref 13.5–17.5)
INR BLD: 1.05 (ref 0.86–1.14)
KETONES, URINE: NEGATIVE MG/DL
LEUKOCYTE ESTERASE, URINE: NEGATIVE
LYMPHOCYTES ABSOLUTE: 2.5 K/UL (ref 1–5.1)
LYMPHOCYTES RELATIVE PERCENT: 18.6 %
MCH RBC QN AUTO: 29.8 PG (ref 26–34)
MCHC RBC AUTO-ENTMCNC: 35.1 G/DL (ref 31–36)
MCV RBC AUTO: 84.9 FL (ref 80–100)
MICROSCOPIC EXAMINATION: NORMAL
MONOCYTES ABSOLUTE: 1 K/UL (ref 0–1.3)
MONOCYTES RELATIVE PERCENT: 7.4 %
NEUTROPHILS ABSOLUTE: 9.4 K/UL (ref 1.7–7.7)
NEUTROPHILS RELATIVE PERCENT: 70.9 %
NITRITE, URINE: NEGATIVE
PDW BLD-RTO: 14.1 % (ref 12.4–15.4)
PH UA: 7.5 (ref 5–8)
PLATELET # BLD: 317 K/UL (ref 135–450)
PMV BLD AUTO: 7.5 FL (ref 5–10.5)
POTASSIUM SERPL-SCNC: 3.5 MMOL/L (ref 3.5–5.1)
PREALBUMIN: 19.6 MG/DL (ref 20–40)
PROTEIN UA: NEGATIVE MG/DL
PROTHROMBIN TIME: 12 SEC (ref 9.8–13)
RBC # BLD: 5.42 M/UL (ref 4.2–5.9)
SEDIMENTATION RATE, ERYTHROCYTE: 6 MM/HR (ref 0–20)
SODIUM BLD-SCNC: 140 MMOL/L (ref 136–145)
SPECIFIC GRAVITY UA: 1.02 (ref 1–1.03)
URINE REFLEX TO CULTURE: NORMAL
URINE TYPE: NORMAL
UROBILINOGEN, URINE: 1 E.U./DL
VITAMIN D 25-HYDROXY: 34.4 NG/ML
WBC # BLD: 13.3 K/UL (ref 4–11)

## 2019-09-25 PROCEDURE — 84134 ASSAY OF PREALBUMIN: CPT

## 2019-09-25 PROCEDURE — 93005 ELECTROCARDIOGRAM TRACING: CPT

## 2019-09-25 PROCEDURE — 85610 PROTHROMBIN TIME: CPT

## 2019-09-25 PROCEDURE — 86901 BLOOD TYPING SEROLOGIC RH(D): CPT

## 2019-09-25 PROCEDURE — 86900 BLOOD TYPING SEROLOGIC ABO: CPT

## 2019-09-25 PROCEDURE — 81003 URINALYSIS AUTO W/O SCOPE: CPT

## 2019-09-25 PROCEDURE — 86850 RBC ANTIBODY SCREEN: CPT

## 2019-09-25 PROCEDURE — 87641 MR-STAPH DNA AMP PROBE: CPT

## 2019-09-25 PROCEDURE — 80048 BASIC METABOLIC PNL TOTAL CA: CPT

## 2019-09-25 PROCEDURE — 83036 HEMOGLOBIN GLYCOSYLATED A1C: CPT

## 2019-09-25 PROCEDURE — 85730 THROMBOPLASTIN TIME PARTIAL: CPT

## 2019-09-25 PROCEDURE — 85025 COMPLETE CBC W/AUTO DIFF WBC: CPT

## 2019-09-25 PROCEDURE — 85652 RBC SED RATE AUTOMATED: CPT

## 2019-09-25 PROCEDURE — 82040 ASSAY OF SERUM ALBUMIN: CPT

## 2019-09-25 PROCEDURE — 93010 ELECTROCARDIOGRAM REPORT: CPT | Performed by: INTERNAL MEDICINE

## 2019-09-25 PROCEDURE — 82306 VITAMIN D 25 HYDROXY: CPT

## 2019-09-25 NOTE — CARE COORDINATION
DATE OF JET CLASS INTERVIEW: 9/25/19--here with his wife Andi Rodas? yes   CHOICES FOR HHC, DME VENDORS AND SKILLED/ REHAB FACILITIES PROVIDED TO PT DURING THIS INTERVIEW. DISCHARGE PLAN:/ INCLUDING WHO WILL BE STAYING WITH YOU AT HOME? Patient lives at home with his wife in a house. There are 2 steps to enter. He plans to return home at dc His wife will be at home with him and can assist with his care. LENGTH OF STAY HAS BEEN DISCUSSED WITH THE PT, APPROPRIATE TO HIS/ HER PROCEDURE. PT HAS BEEN INFORMED THAT THEY WILL BE DISCHARGED WHEN THE PHYSICIAN DEEMS THEM MEDICALLY READY. MOST PATIENTS CAN EXPECT  TO BE IN THE HOSPITAL ONE NIGHT AS AN OBSERVATION ONLY, OR 1-2 DAYS AS AN ADMISSION FOR THOSE WITH MEDICAL HEALTH  ISSUES/COMPLICATIONS. [facility] CARE:  [facility]  SNF/REHAB:  Denies need  DME:  Has a walker and a cane--denies other needs    PT AGREEABLE TO MEDS TO BEDS FROM [facility]. TRANSPORTATION:  His wife can transport at [facility] information for case management provided to pt. Will follow with therapies and social service.   Electronically signed by Miguel Daniel on 9/25/2019 at 12:31 PM

## 2019-09-26 ENCOUNTER — OFFICE VISIT (OUTPATIENT)
Dept: ORTHOPEDIC SURGERY | Age: 64
End: 2019-09-26

## 2019-09-26 DIAGNOSIS — M17.12 ARTHRITIS OF LEFT KNEE: Primary | ICD-10-CM

## 2019-09-26 LAB
ESTIMATED AVERAGE GLUCOSE: 119.8 MG/DL
HBA1C MFR BLD: 5.8 %
MRSA SCREEN RT-PCR: NORMAL

## 2019-09-26 PROCEDURE — 99999 PR OFFICE/OUTPT VISIT,PROCEDURE ONLY: CPT | Performed by: ORTHOPAEDIC SURGERY

## 2019-09-30 RX ORDER — ACETAMINOPHEN 160 MG
2000 TABLET,DISINTEGRATING ORAL DAILY
COMMUNITY

## 2019-09-30 RX ORDER — IBUPROFEN 200 MG
600 TABLET ORAL EVERY 8 HOURS PRN
Status: ON HOLD | COMMUNITY
End: 2019-10-02 | Stop reason: SDUPTHER

## 2019-10-01 ENCOUNTER — ANESTHESIA EVENT (OUTPATIENT)
Dept: OPERATING ROOM | Age: 64
DRG: 470 | End: 2019-10-01
Payer: COMMERCIAL

## 2019-10-02 ENCOUNTER — APPOINTMENT (OUTPATIENT)
Dept: GENERAL RADIOLOGY | Age: 64
DRG: 470 | End: 2019-10-02
Attending: ORTHOPAEDIC SURGERY
Payer: COMMERCIAL

## 2019-10-02 ENCOUNTER — HOSPITAL ENCOUNTER (INPATIENT)
Age: 64
LOS: 1 days | Discharge: HOME HEALTH | DRG: 470 | End: 2019-10-03
Attending: ORTHOPAEDIC SURGERY | Admitting: ORTHOPAEDIC SURGERY
Payer: COMMERCIAL

## 2019-10-02 ENCOUNTER — SURGICAL CONSULT (OUTPATIENT)
Dept: ORTHOPEDIC SURGERY | Age: 64
End: 2019-10-02

## 2019-10-02 ENCOUNTER — ANESTHESIA (OUTPATIENT)
Dept: OPERATING ROOM | Age: 64
DRG: 470 | End: 2019-10-02
Payer: COMMERCIAL

## 2019-10-02 ENCOUNTER — TELEPHONE (OUTPATIENT)
Dept: ORTHOPEDIC SURGERY | Age: 64
End: 2019-10-02

## 2019-10-02 VITALS — OXYGEN SATURATION: 98 % | DIASTOLIC BLOOD PRESSURE: 51 MMHG | SYSTOLIC BLOOD PRESSURE: 91 MMHG

## 2019-10-02 DIAGNOSIS — M17.12 ARTHRITIS OF LEFT KNEE: ICD-10-CM

## 2019-10-02 LAB
ABO/RH: NORMAL
ANTIBODY SCREEN: NORMAL
GLUCOSE BLD-MCNC: 100 MG/DL (ref 70–99)
GLUCOSE BLD-MCNC: 131 MG/DL (ref 70–99)
PERFORMED ON: ABNORMAL
PERFORMED ON: ABNORMAL

## 2019-10-02 PROCEDURE — 2720000010 HC SURG SUPPLY STERILE: Performed by: ORTHOPAEDIC SURGERY

## 2019-10-02 PROCEDURE — 6360000002 HC RX W HCPCS: Performed by: NURSE ANESTHETIST, CERTIFIED REGISTERED

## 2019-10-02 PROCEDURE — 94760 N-INVAS EAR/PLS OXIMETRY 1: CPT

## 2019-10-02 PROCEDURE — 6370000000 HC RX 637 (ALT 250 FOR IP): Performed by: ORTHOPAEDIC SURGERY

## 2019-10-02 PROCEDURE — 2580000003 HC RX 258: Performed by: ANESTHESIOLOGY

## 2019-10-02 PROCEDURE — 88305 TISSUE EXAM BY PATHOLOGIST: CPT

## 2019-10-02 PROCEDURE — 3700000000 HC ANESTHESIA ATTENDED CARE: Performed by: ORTHOPAEDIC SURGERY

## 2019-10-02 PROCEDURE — 3700000001 HC ADD 15 MINUTES (ANESTHESIA): Performed by: ORTHOPAEDIC SURGERY

## 2019-10-02 PROCEDURE — 86901 BLOOD TYPING SEROLOGIC RH(D): CPT

## 2019-10-02 PROCEDURE — 86850 RBC ANTIBODY SCREEN: CPT

## 2019-10-02 PROCEDURE — 97530 THERAPEUTIC ACTIVITIES: CPT

## 2019-10-02 PROCEDURE — 7100000001 HC PACU RECOVERY - ADDTL 15 MIN: Performed by: ORTHOPAEDIC SURGERY

## 2019-10-02 PROCEDURE — 7100000000 HC PACU RECOVERY - FIRST 15 MIN: Performed by: ORTHOPAEDIC SURGERY

## 2019-10-02 PROCEDURE — 6360000002 HC RX W HCPCS: Performed by: ORTHOPAEDIC SURGERY

## 2019-10-02 PROCEDURE — 97161 PT EVAL LOW COMPLEX 20 MIN: CPT

## 2019-10-02 PROCEDURE — 88311 DECALCIFY TISSUE: CPT

## 2019-10-02 PROCEDURE — 97110 THERAPEUTIC EXERCISES: CPT

## 2019-10-02 PROCEDURE — 2709999900 HC NON-CHARGEABLE SUPPLY: Performed by: ORTHOPAEDIC SURGERY

## 2019-10-02 PROCEDURE — G0378 HOSPITAL OBSERVATION PER HR: HCPCS

## 2019-10-02 PROCEDURE — 2060000000 HC ICU INTERMEDIATE R&B

## 2019-10-02 PROCEDURE — C1713 ANCHOR/SCREW BN/BN,TIS/BN: HCPCS | Performed by: ORTHOPAEDIC SURGERY

## 2019-10-02 PROCEDURE — 73560 X-RAY EXAM OF KNEE 1 OR 2: CPT

## 2019-10-02 PROCEDURE — 86900 BLOOD TYPING SEROLOGIC ABO: CPT

## 2019-10-02 PROCEDURE — 2500000003 HC RX 250 WO HCPCS: Performed by: ORTHOPAEDIC SURGERY

## 2019-10-02 PROCEDURE — 3600000005 HC SURGERY LEVEL 5 BASE: Performed by: ORTHOPAEDIC SURGERY

## 2019-10-02 PROCEDURE — 2580000003 HC RX 258: Performed by: ORTHOPAEDIC SURGERY

## 2019-10-02 PROCEDURE — 2500000003 HC RX 250 WO HCPCS: Performed by: NURSE ANESTHETIST, CERTIFIED REGISTERED

## 2019-10-02 PROCEDURE — 94150 VITAL CAPACITY TEST: CPT

## 2019-10-02 PROCEDURE — 0SRD0JA REPLACEMENT OF LEFT KNEE JOINT WITH SYNTHETIC SUBSTITUTE, UNCEMENTED, OPEN APPROACH: ICD-10-PCS | Performed by: ORTHOPAEDIC SURGERY

## 2019-10-02 PROCEDURE — 3600000015 HC SURGERY LEVEL 5 ADDTL 15MIN: Performed by: ORTHOPAEDIC SURGERY

## 2019-10-02 PROCEDURE — 97116 GAIT TRAINING THERAPY: CPT

## 2019-10-02 PROCEDURE — 8E0Y0CZ ROBOTIC ASSISTED PROCEDURE OF LOWER EXTREMITY, OPEN APPROACH: ICD-10-PCS | Performed by: ORTHOPAEDIC SURGERY

## 2019-10-02 PROCEDURE — C1776 JOINT DEVICE (IMPLANTABLE): HCPCS | Performed by: ORTHOPAEDIC SURGERY

## 2019-10-02 DEVICE — BASEPLATE TIB SZ 6 AP52MM ML77MM KNEE TRITANIUM 4 CRUCFRM: Type: IMPLANTABLE DEVICE | Site: KNEE | Status: FUNCTIONAL

## 2019-10-02 DEVICE — Z DUP USE 2373673 TRITANIUM SYMMETRIC METAL BACKED PATELLA S36X10: Type: IMPLANTABLE DEVICE | Site: KNEE | Status: FUNCTIONAL

## 2019-10-02 DEVICE — IMPLANTABLE DEVICE: Type: IMPLANTABLE DEVICE | Site: KNEE | Status: FUNCTIONAL

## 2019-10-02 DEVICE — IMPL KNEE TIB INSRT POSTR SZ 6 9MM: Type: IMPLANTABLE DEVICE | Site: KNEE | Status: FUNCTIONAL

## 2019-10-02 RX ORDER — DEXTROSE MONOHYDRATE 25 G/50ML
12.5 INJECTION, SOLUTION INTRAVENOUS PRN
Status: DISCONTINUED | OUTPATIENT
Start: 2019-10-02 | End: 2019-10-03

## 2019-10-02 RX ORDER — SODIUM CHLORIDE 9 MG/ML
INJECTION, SOLUTION INTRAVENOUS CONTINUOUS
Status: DISCONTINUED | OUTPATIENT
Start: 2019-10-02 | End: 2019-10-02

## 2019-10-02 RX ORDER — SODIUM CHLORIDE 450 MG/100ML
INJECTION, SOLUTION INTRAVENOUS CONTINUOUS
Status: DISCONTINUED | OUTPATIENT
Start: 2019-10-02 | End: 2019-10-03

## 2019-10-02 RX ORDER — ATENOLOL 25 MG/1
25 TABLET ORAL DAILY
Status: DISCONTINUED | OUTPATIENT
Start: 2019-10-03 | End: 2019-10-03 | Stop reason: HOSPADM

## 2019-10-02 RX ORDER — BUPIVACAINE HYDROCHLORIDE 7.5 MG/ML
INJECTION, SOLUTION INTRASPINAL PRN
Status: DISCONTINUED | OUTPATIENT
Start: 2019-10-02 | End: 2019-10-02

## 2019-10-02 RX ORDER — MIDAZOLAM HYDROCHLORIDE 1 MG/ML
INJECTION INTRAMUSCULAR; INTRAVENOUS PRN
Status: DISCONTINUED | OUTPATIENT
Start: 2019-10-02 | End: 2019-10-02 | Stop reason: SDUPTHER

## 2019-10-02 RX ORDER — OXYCODONE HYDROCHLORIDE 5 MG/1
5-10 TABLET ORAL EVERY 4 HOURS PRN
Qty: 40 TABLET | Refills: 0 | Status: SHIPPED | OUTPATIENT
Start: 2019-10-02 | End: 2019-10-09

## 2019-10-02 RX ORDER — FENTANYL CITRATE 50 UG/ML
25 INJECTION, SOLUTION INTRAMUSCULAR; INTRAVENOUS EVERY 5 MIN PRN
Status: DISCONTINUED | OUTPATIENT
Start: 2019-10-02 | End: 2019-10-02 | Stop reason: HOSPADM

## 2019-10-02 RX ORDER — SODIUM CHLORIDE 0.9 % (FLUSH) 0.9 %
10 SYRINGE (ML) INJECTION PRN
Status: DISCONTINUED | OUTPATIENT
Start: 2019-10-02 | End: 2019-10-02 | Stop reason: HOSPADM

## 2019-10-02 RX ORDER — ONDANSETRON 2 MG/ML
4 INJECTION INTRAMUSCULAR; INTRAVENOUS EVERY 6 HOURS PRN
Status: DISCONTINUED | OUTPATIENT
Start: 2019-10-02 | End: 2019-10-03 | Stop reason: HOSPADM

## 2019-10-02 RX ORDER — SODIUM CHLORIDE 0.9 % (FLUSH) 0.9 %
10 SYRINGE (ML) INJECTION PRN
Status: DISCONTINUED | OUTPATIENT
Start: 2019-10-02 | End: 2019-10-03 | Stop reason: HOSPADM

## 2019-10-02 RX ORDER — CELECOXIB 200 MG/1
200 CAPSULE ORAL EVERY 24 HOURS
Status: DISCONTINUED | OUTPATIENT
Start: 2019-10-03 | End: 2019-10-03 | Stop reason: HOSPADM

## 2019-10-02 RX ORDER — MORPHINE SULFATE 2 MG/ML
2 INJECTION, SOLUTION INTRAMUSCULAR; INTRAVENOUS
Status: DISCONTINUED | OUTPATIENT
Start: 2019-10-02 | End: 2019-10-03 | Stop reason: HOSPADM

## 2019-10-02 RX ORDER — OXYCODONE HYDROCHLORIDE AND ACETAMINOPHEN 5; 325 MG/1; MG/1
1 TABLET ORAL PRN
Status: DISCONTINUED | OUTPATIENT
Start: 2019-10-02 | End: 2019-10-02 | Stop reason: HOSPADM

## 2019-10-02 RX ORDER — BUPIVACAINE HYDROCHLORIDE 7.5 MG/ML
INJECTION, SOLUTION INTRASPINAL PRN
Status: DISCONTINUED | OUTPATIENT
Start: 2019-10-02 | End: 2019-10-02 | Stop reason: SDUPTHER

## 2019-10-02 RX ORDER — INSULIN GLARGINE 100 [IU]/ML
0.25 INJECTION, SOLUTION SUBCUTANEOUS NIGHTLY
Status: DISCONTINUED | OUTPATIENT
Start: 2019-10-02 | End: 2019-10-02

## 2019-10-02 RX ORDER — OXYCODONE HYDROCHLORIDE 10 MG/1
10 TABLET ORAL EVERY 4 HOURS PRN
Status: DISCONTINUED | OUTPATIENT
Start: 2019-10-02 | End: 2019-10-03 | Stop reason: HOSPADM

## 2019-10-02 RX ORDER — DOCUSATE SODIUM 100 MG/1
100 CAPSULE, LIQUID FILLED ORAL 2 TIMES DAILY
Status: DISCONTINUED | OUTPATIENT
Start: 2019-10-02 | End: 2019-10-03 | Stop reason: HOSPADM

## 2019-10-02 RX ORDER — OXYCODONE HYDROCHLORIDE 5 MG/1
5 TABLET ORAL EVERY 4 HOURS PRN
Status: DISCONTINUED | OUTPATIENT
Start: 2019-10-02 | End: 2019-10-03 | Stop reason: HOSPADM

## 2019-10-02 RX ORDER — CHLORTHALIDONE 25 MG/1
25 TABLET ORAL DAILY
Status: DISCONTINUED | OUTPATIENT
Start: 2019-10-03 | End: 2019-10-03 | Stop reason: HOSPADM

## 2019-10-02 RX ORDER — ONDANSETRON 2 MG/ML
4 INJECTION INTRAMUSCULAR; INTRAVENOUS
Status: DISCONTINUED | OUTPATIENT
Start: 2019-10-02 | End: 2019-10-02 | Stop reason: HOSPADM

## 2019-10-02 RX ORDER — FENTANYL CITRATE 50 UG/ML
INJECTION, SOLUTION INTRAMUSCULAR; INTRAVENOUS PRN
Status: DISCONTINUED | OUTPATIENT
Start: 2019-10-02 | End: 2019-10-02 | Stop reason: SDUPTHER

## 2019-10-02 RX ORDER — IBUPROFEN 200 MG
600 TABLET ORAL EVERY 8 HOURS PRN
Qty: 120 TABLET | Refills: 3
Start: 2019-10-02

## 2019-10-02 RX ORDER — NICOTINE POLACRILEX 4 MG
15 LOZENGE BUCCAL PRN
Status: DISCONTINUED | OUTPATIENT
Start: 2019-10-02 | End: 2019-10-03

## 2019-10-02 RX ORDER — ASPIRIN 81 MG/1
81 TABLET ORAL 2 TIMES DAILY
Qty: 28 TABLET | Refills: 0 | Status: SHIPPED | OUTPATIENT
Start: 2019-10-02 | End: 2021-10-15

## 2019-10-02 RX ORDER — SODIUM CHLORIDE 0.9 % (FLUSH) 0.9 %
10 SYRINGE (ML) INJECTION EVERY 12 HOURS SCHEDULED
Status: DISCONTINUED | OUTPATIENT
Start: 2019-10-02 | End: 2019-10-03 | Stop reason: HOSPADM

## 2019-10-02 RX ORDER — CELECOXIB 200 MG/1
200 CAPSULE ORAL ONCE
Status: COMPLETED | OUTPATIENT
Start: 2019-10-02 | End: 2019-10-02

## 2019-10-02 RX ORDER — OXYCODONE HYDROCHLORIDE AND ACETAMINOPHEN 5; 325 MG/1; MG/1
2 TABLET ORAL PRN
Status: DISCONTINUED | OUTPATIENT
Start: 2019-10-02 | End: 2019-10-02 | Stop reason: HOSPADM

## 2019-10-02 RX ORDER — DEXTROSE MONOHYDRATE 50 MG/ML
100 INJECTION, SOLUTION INTRAVENOUS PRN
Status: DISCONTINUED | OUTPATIENT
Start: 2019-10-02 | End: 2019-10-03

## 2019-10-02 RX ORDER — SODIUM CHLORIDE 0.9 % (FLUSH) 0.9 %
10 SYRINGE (ML) INJECTION EVERY 12 HOURS SCHEDULED
Status: DISCONTINUED | OUTPATIENT
Start: 2019-10-02 | End: 2019-10-02 | Stop reason: HOSPADM

## 2019-10-02 RX ORDER — OXYCODONE HYDROCHLORIDE 5 MG/1
10 TABLET ORAL ONCE
Status: COMPLETED | OUTPATIENT
Start: 2019-10-02 | End: 2019-10-02

## 2019-10-02 RX ADMIN — DOCUSATE SODIUM 100 MG: 100 CAPSULE, LIQUID FILLED ORAL at 20:54

## 2019-10-02 RX ADMIN — SODIUM CHLORIDE: 9 INJECTION, SOLUTION INTRAVENOUS at 10:51

## 2019-10-02 RX ADMIN — BUPIVACAINE HYDROCHLORIDE IN DEXTROSE 13.5 MG: 7.5 INJECTION, SOLUTION SUBARACHNOID at 11:03

## 2019-10-02 RX ADMIN — FENTANYL CITRATE 25 MCG: 50 INJECTION INTRAMUSCULAR; INTRAVENOUS at 10:55

## 2019-10-02 RX ADMIN — Medication 2 G: at 10:51

## 2019-10-02 RX ADMIN — SODIUM CHLORIDE: 4.5 INJECTION, SOLUTION INTRAVENOUS at 14:32

## 2019-10-02 RX ADMIN — OXYCODONE HYDROCHLORIDE 10 MG: 10 TABLET ORAL at 19:04

## 2019-10-02 RX ADMIN — SODIUM CHLORIDE: 4.5 INJECTION, SOLUTION INTRAVENOUS at 23:00

## 2019-10-02 RX ADMIN — CELECOXIB 200 MG: 200 CAPSULE ORAL at 09:26

## 2019-10-02 RX ADMIN — MIDAZOLAM 2 MG: 1 INJECTION INTRAMUSCULAR; INTRAVENOUS at 10:51

## 2019-10-02 RX ADMIN — OXYCODONE HYDROCHLORIDE 10 MG: 5 TABLET ORAL at 09:26

## 2019-10-02 RX ADMIN — FENTANYL CITRATE 25 MCG: 50 INJECTION INTRAMUSCULAR; INTRAVENOUS at 10:51

## 2019-10-02 RX ADMIN — Medication 2 G: at 18:48

## 2019-10-02 RX ADMIN — FENTANYL CITRATE 50 MCG: 50 INJECTION INTRAMUSCULAR; INTRAVENOUS at 10:54

## 2019-10-02 RX ADMIN — FAMOTIDINE 20 MG: 10 INJECTION, SOLUTION INTRAVENOUS at 20:54

## 2019-10-02 RX ADMIN — TRANEXAMIC ACID 1000 MG: 1 INJECTION, SOLUTION INTRAVENOUS at 10:30

## 2019-10-02 ASSESSMENT — PULMONARY FUNCTION TESTS
PIF_VALUE: 0
PIF_VALUE: 1
PIF_VALUE: 0
PIF_VALUE: 1
PIF_VALUE: 0
PIF_VALUE: 1
PIF_VALUE: 0
PIF_VALUE: 1
PIF_VALUE: 0
PIF_VALUE: 1
PIF_VALUE: 0

## 2019-10-02 ASSESSMENT — PAIN SCALES - GENERAL
PAINLEVEL_OUTOF10: 0
PAINLEVEL_OUTOF10: 7
PAINLEVEL_OUTOF10: 0

## 2019-10-02 ASSESSMENT — PAIN DESCRIPTION - PROGRESSION: CLINICAL_PROGRESSION: GRADUALLY WORSENING

## 2019-10-02 ASSESSMENT — PAIN DESCRIPTION - FREQUENCY: FREQUENCY: INTERMITTENT

## 2019-10-02 ASSESSMENT — PAIN DESCRIPTION - ONSET: ONSET: ON-GOING

## 2019-10-02 ASSESSMENT — PAIN - FUNCTIONAL ASSESSMENT
PAIN_FUNCTIONAL_ASSESSMENT: 0-10
PAIN_FUNCTIONAL_ASSESSMENT: PREVENTS OR INTERFERES SOME ACTIVE ACTIVITIES AND ADLS

## 2019-10-02 ASSESSMENT — PAIN DESCRIPTION - PAIN TYPE: TYPE: SURGICAL PAIN

## 2019-10-02 ASSESSMENT — PAIN DESCRIPTION - DESCRIPTORS
DESCRIPTORS: ACHING
DESCRIPTORS: ACHING

## 2019-10-02 ASSESSMENT — PAIN DESCRIPTION - ORIENTATION: ORIENTATION: LEFT

## 2019-10-02 ASSESSMENT — PAIN DESCRIPTION - LOCATION: LOCATION: KNEE

## 2019-10-03 VITALS
BODY MASS INDEX: 31.56 KG/M2 | WEIGHT: 220.46 LBS | HEART RATE: 59 BPM | RESPIRATION RATE: 17 BRPM | SYSTOLIC BLOOD PRESSURE: 126 MMHG | OXYGEN SATURATION: 97 % | DIASTOLIC BLOOD PRESSURE: 81 MMHG | TEMPERATURE: 98.2 F | HEIGHT: 70 IN

## 2019-10-03 LAB
ESTIMATED AVERAGE GLUCOSE: 122.6 MG/DL
GLUCOSE BLD-MCNC: 129 MG/DL (ref 70–99)
HBA1C MFR BLD: 5.9 %
HCT VFR BLD CALC: 40.3 % (ref 40.5–52.5)
HEMOGLOBIN: 14.2 G/DL (ref 13.5–17.5)
PERFORMED ON: ABNORMAL

## 2019-10-03 PROCEDURE — 96372 THER/PROPH/DIAG INJ SC/IM: CPT

## 2019-10-03 PROCEDURE — 97110 THERAPEUTIC EXERCISES: CPT

## 2019-10-03 PROCEDURE — 6370000000 HC RX 637 (ALT 250 FOR IP): Performed by: ORTHOPAEDIC SURGERY

## 2019-10-03 PROCEDURE — 6370000000 HC RX 637 (ALT 250 FOR IP): Performed by: NURSE PRACTITIONER

## 2019-10-03 PROCEDURE — 83036 HEMOGLOBIN GLYCOSYLATED A1C: CPT

## 2019-10-03 PROCEDURE — 6360000002 HC RX W HCPCS: Performed by: ORTHOPAEDIC SURGERY

## 2019-10-03 PROCEDURE — 2580000003 HC RX 258: Performed by: ORTHOPAEDIC SURGERY

## 2019-10-03 PROCEDURE — 97530 THERAPEUTIC ACTIVITIES: CPT

## 2019-10-03 PROCEDURE — 36415 COLL VENOUS BLD VENIPUNCTURE: CPT

## 2019-10-03 PROCEDURE — G0378 HOSPITAL OBSERVATION PER HR: HCPCS

## 2019-10-03 PROCEDURE — 97116 GAIT TRAINING THERAPY: CPT

## 2019-10-03 PROCEDURE — 97535 SELF CARE MNGMENT TRAINING: CPT

## 2019-10-03 PROCEDURE — 85014 HEMATOCRIT: CPT

## 2019-10-03 PROCEDURE — 85018 HEMOGLOBIN: CPT

## 2019-10-03 PROCEDURE — 97165 OT EVAL LOW COMPLEX 30 MIN: CPT

## 2019-10-03 RX ORDER — FAMOTIDINE 20 MG/1
20 TABLET, FILM COATED ORAL 2 TIMES DAILY
Status: DISCONTINUED | OUTPATIENT
Start: 2019-10-03 | End: 2019-10-03 | Stop reason: HOSPADM

## 2019-10-03 RX ADMIN — CELECOXIB 200 MG: 200 CAPSULE ORAL at 06:00

## 2019-10-03 RX ADMIN — ATENOLOL 25 MG: 25 TABLET ORAL at 08:06

## 2019-10-03 RX ADMIN — ENOXAPARIN SODIUM 30 MG: 30 INJECTION SUBCUTANEOUS at 06:00

## 2019-10-03 RX ADMIN — OXYCODONE HYDROCHLORIDE 10 MG: 10 TABLET ORAL at 08:06

## 2019-10-03 RX ADMIN — DOCUSATE SODIUM 100 MG: 100 CAPSULE, LIQUID FILLED ORAL at 08:06

## 2019-10-03 RX ADMIN — CHLORTHALIDONE 25 MG: 25 TABLET ORAL at 08:06

## 2019-10-03 RX ADMIN — SODIUM CHLORIDE, PRESERVATIVE FREE 10 ML: 5 INJECTION INTRAVENOUS at 08:08

## 2019-10-03 RX ADMIN — Medication 2 G: at 02:55

## 2019-10-03 RX ADMIN — FAMOTIDINE 20 MG: 20 TABLET, FILM COATED ORAL at 08:06

## 2019-10-03 ASSESSMENT — PAIN - FUNCTIONAL ASSESSMENT
PAIN_FUNCTIONAL_ASSESSMENT: ACTIVITIES ARE NOT PREVENTED
PAIN_FUNCTIONAL_ASSESSMENT: ACTIVITIES ARE NOT PREVENTED

## 2019-10-03 ASSESSMENT — PAIN DESCRIPTION - LOCATION
LOCATION: KNEE
LOCATION: KNEE

## 2019-10-03 ASSESSMENT — PAIN SCALES - GENERAL
PAINLEVEL_OUTOF10: 3
PAINLEVEL_OUTOF10: 7
PAINLEVEL_OUTOF10: 0
PAINLEVEL_OUTOF10: 0

## 2019-10-03 ASSESSMENT — PAIN DESCRIPTION - FREQUENCY
FREQUENCY: INTERMITTENT
FREQUENCY: INTERMITTENT

## 2019-10-03 ASSESSMENT — PAIN DESCRIPTION - PAIN TYPE
TYPE: ACUTE PAIN;SURGICAL PAIN
TYPE: ACUTE PAIN;SURGICAL PAIN

## 2019-10-03 ASSESSMENT — PAIN DESCRIPTION - ONSET
ONSET: ON-GOING
ONSET: ON-GOING

## 2019-10-03 ASSESSMENT — PAIN DESCRIPTION - PROGRESSION
CLINICAL_PROGRESSION: GRADUALLY IMPROVING
CLINICAL_PROGRESSION: GRADUALLY IMPROVING

## 2019-10-03 ASSESSMENT — PAIN DESCRIPTION - DESCRIPTORS
DESCRIPTORS: ACHING
DESCRIPTORS: ACHING

## 2019-10-03 ASSESSMENT — PAIN DESCRIPTION - ORIENTATION
ORIENTATION: LEFT
ORIENTATION: LEFT

## 2019-10-10 ENCOUNTER — TELEPHONE (OUTPATIENT)
Dept: ORTHOPEDICS UNIT | Age: 64
End: 2019-10-10

## 2019-10-11 ENCOUNTER — TELEPHONE (OUTPATIENT)
Dept: ORTHOPEDIC SURGERY | Age: 64
End: 2019-10-11

## 2019-10-17 ENCOUNTER — OFFICE VISIT (OUTPATIENT)
Dept: ORTHOPEDIC SURGERY | Age: 64
End: 2019-10-17

## 2019-10-17 VITALS — TEMPERATURE: 98.3 F

## 2019-10-17 DIAGNOSIS — Z96.652 HISTORY OF TOTAL KNEE REPLACEMENT, LEFT: Primary | ICD-10-CM

## 2019-10-17 PROCEDURE — 99024 POSTOP FOLLOW-UP VISIT: CPT | Performed by: PHYSICIAN ASSISTANT

## 2019-10-17 PROCEDURE — APPSS15 APP SPLIT SHARED TIME 0-15 MINUTES: Performed by: PHYSICIAN ASSISTANT

## 2019-10-21 ENCOUNTER — HOSPITAL ENCOUNTER (OUTPATIENT)
Dept: PHYSICAL THERAPY | Age: 64
Setting detail: THERAPIES SERIES
Discharge: HOME | End: 2019-10-21
Payer: COMMERCIAL

## 2019-10-21 DIAGNOSIS — M17.12 ARTHRITIS OF LEFT KNEE: ICD-10-CM

## 2019-10-21 PROCEDURE — 97016 VASOPNEUMATIC DEVICE THERAPY: CPT | Performed by: PHYSICAL THERAPIST

## 2019-10-21 PROCEDURE — 97110 THERAPEUTIC EXERCISES: CPT | Performed by: PHYSICAL THERAPIST

## 2019-10-21 PROCEDURE — 97530 THERAPEUTIC ACTIVITIES: CPT | Performed by: PHYSICAL THERAPIST

## 2019-10-21 PROCEDURE — 97164 PT RE-EVAL EST PLAN CARE: CPT | Performed by: PHYSICAL THERAPIST

## 2019-10-23 ENCOUNTER — HOSPITAL ENCOUNTER (OUTPATIENT)
Dept: PHYSICAL THERAPY | Age: 64
Setting detail: THERAPIES SERIES
Discharge: HOME | End: 2019-10-23
Payer: COMMERCIAL

## 2019-10-23 PROCEDURE — 97112 NEUROMUSCULAR REEDUCATION: CPT | Performed by: PHYSICAL THERAPIST

## 2019-10-23 PROCEDURE — 97110 THERAPEUTIC EXERCISES: CPT | Performed by: PHYSICAL THERAPIST

## 2019-10-23 PROCEDURE — 97530 THERAPEUTIC ACTIVITIES: CPT | Performed by: PHYSICAL THERAPIST

## 2019-10-29 ENCOUNTER — HOSPITAL ENCOUNTER (OUTPATIENT)
Dept: PHYSICAL THERAPY | Age: 64
Setting detail: THERAPIES SERIES
Discharge: HOME | End: 2019-10-29
Payer: COMMERCIAL

## 2019-10-29 PROCEDURE — 97530 THERAPEUTIC ACTIVITIES: CPT | Performed by: PHYSICAL THERAPIST

## 2019-10-29 PROCEDURE — 97110 THERAPEUTIC EXERCISES: CPT | Performed by: PHYSICAL THERAPIST

## 2019-10-29 PROCEDURE — 97112 NEUROMUSCULAR REEDUCATION: CPT | Performed by: PHYSICAL THERAPIST

## 2019-10-29 PROCEDURE — 97016 VASOPNEUMATIC DEVICE THERAPY: CPT | Performed by: PHYSICAL THERAPIST

## 2019-10-31 ENCOUNTER — HOSPITAL ENCOUNTER (OUTPATIENT)
Dept: PHYSICAL THERAPY | Age: 64
Setting detail: THERAPIES SERIES
Discharge: HOME | End: 2019-10-31
Payer: COMMERCIAL

## 2019-10-31 PROCEDURE — 97112 NEUROMUSCULAR REEDUCATION: CPT | Performed by: PHYSICAL THERAPIST

## 2019-10-31 PROCEDURE — 97110 THERAPEUTIC EXERCISES: CPT | Performed by: PHYSICAL THERAPIST

## 2019-10-31 PROCEDURE — 97530 THERAPEUTIC ACTIVITIES: CPT | Performed by: PHYSICAL THERAPIST

## 2019-10-31 PROCEDURE — 97140 MANUAL THERAPY 1/> REGIONS: CPT | Performed by: PHYSICAL THERAPIST

## 2019-10-31 PROCEDURE — 97016 VASOPNEUMATIC DEVICE THERAPY: CPT | Performed by: PHYSICAL THERAPIST

## 2019-11-05 ENCOUNTER — HOSPITAL ENCOUNTER (OUTPATIENT)
Dept: PHYSICAL THERAPY | Age: 64
Setting detail: THERAPIES SERIES
Discharge: HOME | End: 2019-11-05
Payer: COMMERCIAL

## 2019-11-05 PROCEDURE — 97530 THERAPEUTIC ACTIVITIES: CPT | Performed by: PHYSICAL THERAPIST

## 2019-11-05 PROCEDURE — 97140 MANUAL THERAPY 1/> REGIONS: CPT | Performed by: PHYSICAL THERAPIST

## 2019-11-05 PROCEDURE — 97110 THERAPEUTIC EXERCISES: CPT | Performed by: PHYSICAL THERAPIST

## 2019-11-05 PROCEDURE — 97016 VASOPNEUMATIC DEVICE THERAPY: CPT | Performed by: PHYSICAL THERAPIST

## 2019-11-07 ENCOUNTER — HOSPITAL ENCOUNTER (OUTPATIENT)
Dept: PHYSICAL THERAPY | Age: 64
Setting detail: THERAPIES SERIES
Discharge: HOME | End: 2019-11-07
Payer: COMMERCIAL

## 2019-11-07 ENCOUNTER — OFFICE VISIT (OUTPATIENT)
Dept: ORTHOPEDIC SURGERY | Age: 64
End: 2019-11-07

## 2019-11-07 VITALS — TEMPERATURE: 96.9 F

## 2019-11-07 DIAGNOSIS — Z96.652 HISTORY OF TOTAL KNEE REPLACEMENT, LEFT: Primary | ICD-10-CM

## 2019-11-07 PROCEDURE — 99024 POSTOP FOLLOW-UP VISIT: CPT | Performed by: PHYSICIAN ASSISTANT

## 2019-11-07 PROCEDURE — 97016 VASOPNEUMATIC DEVICE THERAPY: CPT | Performed by: PHYSICAL THERAPIST

## 2019-11-07 PROCEDURE — 97110 THERAPEUTIC EXERCISES: CPT | Performed by: PHYSICAL THERAPIST

## 2019-11-07 PROCEDURE — 97140 MANUAL THERAPY 1/> REGIONS: CPT | Performed by: PHYSICAL THERAPIST

## 2019-11-07 PROCEDURE — 97530 THERAPEUTIC ACTIVITIES: CPT | Performed by: PHYSICAL THERAPIST

## 2019-11-12 ENCOUNTER — HOSPITAL ENCOUNTER (OUTPATIENT)
Dept: PHYSICAL THERAPY | Age: 64
Setting detail: THERAPIES SERIES
Discharge: HOME | End: 2019-11-12
Payer: COMMERCIAL

## 2019-11-12 PROCEDURE — 97140 MANUAL THERAPY 1/> REGIONS: CPT | Performed by: PHYSICAL THERAPIST

## 2019-11-12 PROCEDURE — 97530 THERAPEUTIC ACTIVITIES: CPT | Performed by: PHYSICAL THERAPIST

## 2019-11-12 PROCEDURE — 97016 VASOPNEUMATIC DEVICE THERAPY: CPT | Performed by: PHYSICAL THERAPIST

## 2019-11-12 PROCEDURE — 97110 THERAPEUTIC EXERCISES: CPT | Performed by: PHYSICAL THERAPIST

## 2019-11-14 ENCOUNTER — HOSPITAL ENCOUNTER (OUTPATIENT)
Dept: PHYSICAL THERAPY | Age: 64
Setting detail: THERAPIES SERIES
Discharge: HOME | End: 2019-11-14
Payer: COMMERCIAL

## 2019-11-14 PROCEDURE — 97140 MANUAL THERAPY 1/> REGIONS: CPT | Performed by: PHYSICAL THERAPIST

## 2019-11-14 PROCEDURE — 97110 THERAPEUTIC EXERCISES: CPT | Performed by: PHYSICAL THERAPIST

## 2019-11-14 PROCEDURE — 97016 VASOPNEUMATIC DEVICE THERAPY: CPT | Performed by: PHYSICAL THERAPIST

## 2019-11-14 PROCEDURE — 97530 THERAPEUTIC ACTIVITIES: CPT | Performed by: PHYSICAL THERAPIST

## 2019-11-19 ENCOUNTER — HOSPITAL ENCOUNTER (OUTPATIENT)
Dept: PHYSICAL THERAPY | Age: 64
Setting detail: THERAPIES SERIES
Discharge: HOME | End: 2019-11-19
Payer: COMMERCIAL

## 2019-11-19 PROCEDURE — 97140 MANUAL THERAPY 1/> REGIONS: CPT | Performed by: PHYSICAL THERAPIST

## 2019-11-19 PROCEDURE — 97110 THERAPEUTIC EXERCISES: CPT | Performed by: PHYSICAL THERAPIST

## 2019-11-19 PROCEDURE — 97016 VASOPNEUMATIC DEVICE THERAPY: CPT | Performed by: PHYSICAL THERAPIST

## 2019-11-19 PROCEDURE — 97530 THERAPEUTIC ACTIVITIES: CPT | Performed by: PHYSICAL THERAPIST

## 2019-11-21 ENCOUNTER — HOSPITAL ENCOUNTER (OUTPATIENT)
Dept: PHYSICAL THERAPY | Age: 64
Setting detail: THERAPIES SERIES
Discharge: HOME | End: 2019-11-21
Payer: COMMERCIAL

## 2019-11-21 PROCEDURE — 97016 VASOPNEUMATIC DEVICE THERAPY: CPT | Performed by: PHYSICAL THERAPIST

## 2019-11-21 PROCEDURE — 97110 THERAPEUTIC EXERCISES: CPT | Performed by: PHYSICAL THERAPIST

## 2019-11-21 PROCEDURE — 97530 THERAPEUTIC ACTIVITIES: CPT | Performed by: PHYSICAL THERAPIST

## 2019-11-21 PROCEDURE — 97140 MANUAL THERAPY 1/> REGIONS: CPT | Performed by: PHYSICAL THERAPIST

## 2019-11-25 ENCOUNTER — HOSPITAL ENCOUNTER (OUTPATIENT)
Dept: PHYSICAL THERAPY | Age: 64
Setting detail: THERAPIES SERIES
Discharge: HOME | End: 2019-11-25
Payer: COMMERCIAL

## 2019-11-25 PROCEDURE — 97016 VASOPNEUMATIC DEVICE THERAPY: CPT | Performed by: PHYSICAL THERAPIST

## 2019-11-25 PROCEDURE — 97530 THERAPEUTIC ACTIVITIES: CPT | Performed by: PHYSICAL THERAPIST

## 2019-11-25 PROCEDURE — 97110 THERAPEUTIC EXERCISES: CPT | Performed by: PHYSICAL THERAPIST

## 2019-11-27 ENCOUNTER — HOSPITAL ENCOUNTER (OUTPATIENT)
Dept: PHYSICAL THERAPY | Age: 64
Setting detail: THERAPIES SERIES
Discharge: HOME | End: 2019-11-27
Payer: COMMERCIAL

## 2019-11-27 PROCEDURE — 97530 THERAPEUTIC ACTIVITIES: CPT | Performed by: PHYSICAL THERAPIST

## 2019-11-27 PROCEDURE — 97110 THERAPEUTIC EXERCISES: CPT | Performed by: PHYSICAL THERAPIST

## 2019-12-03 ENCOUNTER — HOSPITAL ENCOUNTER (OUTPATIENT)
Dept: PHYSICAL THERAPY | Age: 64
Setting detail: THERAPIES SERIES
Discharge: HOME | End: 2019-12-03
Payer: COMMERCIAL

## 2019-12-03 PROCEDURE — 97530 THERAPEUTIC ACTIVITIES: CPT | Performed by: PHYSICAL THERAPIST

## 2019-12-03 PROCEDURE — 97110 THERAPEUTIC EXERCISES: CPT | Performed by: PHYSICAL THERAPIST

## 2019-12-05 ENCOUNTER — HOSPITAL ENCOUNTER (OUTPATIENT)
Dept: PHYSICAL THERAPY | Age: 64
Setting detail: THERAPIES SERIES
Discharge: HOME | End: 2019-12-05
Payer: COMMERCIAL

## 2019-12-05 PROCEDURE — 97110 THERAPEUTIC EXERCISES: CPT | Performed by: PHYSICAL THERAPIST

## 2019-12-05 PROCEDURE — 97530 THERAPEUTIC ACTIVITIES: CPT | Performed by: PHYSICAL THERAPIST

## 2019-12-09 ENCOUNTER — HOSPITAL ENCOUNTER (OUTPATIENT)
Dept: PHYSICAL THERAPY | Age: 64
Setting detail: THERAPIES SERIES
Discharge: HOME | End: 2019-12-09
Payer: COMMERCIAL

## 2019-12-09 PROCEDURE — 97530 THERAPEUTIC ACTIVITIES: CPT | Performed by: PHYSICAL THERAPIST

## 2019-12-09 PROCEDURE — 97110 THERAPEUTIC EXERCISES: CPT | Performed by: PHYSICAL THERAPIST

## 2019-12-11 ENCOUNTER — HOSPITAL ENCOUNTER (OUTPATIENT)
Dept: PHYSICAL THERAPY | Age: 64
Setting detail: THERAPIES SERIES
Discharge: HOME | End: 2019-12-11
Payer: COMMERCIAL

## 2019-12-11 PROCEDURE — 97530 THERAPEUTIC ACTIVITIES: CPT | Performed by: PHYSICAL THERAPIST

## 2019-12-11 PROCEDURE — 97110 THERAPEUTIC EXERCISES: CPT | Performed by: PHYSICAL THERAPIST

## 2019-12-17 ENCOUNTER — HOSPITAL ENCOUNTER (OUTPATIENT)
Dept: PHYSICAL THERAPY | Age: 64
Setting detail: THERAPIES SERIES
Discharge: HOME | End: 2019-12-17
Payer: COMMERCIAL

## 2019-12-17 PROCEDURE — 97530 THERAPEUTIC ACTIVITIES: CPT | Performed by: PHYSICAL THERAPIST

## 2019-12-17 PROCEDURE — 97110 THERAPEUTIC EXERCISES: CPT | Performed by: PHYSICAL THERAPIST

## 2019-12-19 ENCOUNTER — HOSPITAL ENCOUNTER (OUTPATIENT)
Dept: PHYSICAL THERAPY | Age: 64
Setting detail: THERAPIES SERIES
Discharge: HOME | End: 2019-12-19
Payer: COMMERCIAL

## 2019-12-19 PROCEDURE — 97110 THERAPEUTIC EXERCISES: CPT | Performed by: PHYSICAL THERAPIST

## 2019-12-19 PROCEDURE — 97530 THERAPEUTIC ACTIVITIES: CPT | Performed by: PHYSICAL THERAPIST

## 2019-12-23 ENCOUNTER — HOSPITAL ENCOUNTER (OUTPATIENT)
Dept: PHYSICAL THERAPY | Age: 64
Setting detail: THERAPIES SERIES
Discharge: HOME | End: 2019-12-23
Payer: COMMERCIAL

## 2019-12-23 PROCEDURE — 97530 THERAPEUTIC ACTIVITIES: CPT | Performed by: PHYSICAL THERAPIST

## 2019-12-23 PROCEDURE — 97110 THERAPEUTIC EXERCISES: CPT | Performed by: PHYSICAL THERAPIST

## 2019-12-27 ENCOUNTER — HOSPITAL ENCOUNTER (OUTPATIENT)
Dept: PHYSICAL THERAPY | Age: 64
Setting detail: THERAPIES SERIES
Discharge: HOME | End: 2019-12-27
Payer: COMMERCIAL

## 2019-12-27 PROCEDURE — 97530 THERAPEUTIC ACTIVITIES: CPT | Performed by: PHYSICAL THERAPIST

## 2019-12-27 PROCEDURE — 97110 THERAPEUTIC EXERCISES: CPT | Performed by: PHYSICAL THERAPIST

## 2019-12-30 ENCOUNTER — APPOINTMENT (OUTPATIENT)
Dept: PHYSICAL THERAPY | Age: 64
End: 2019-12-30
Payer: COMMERCIAL

## 2020-02-10 ENCOUNTER — OFFICE VISIT (OUTPATIENT)
Dept: ORTHOPEDIC SURGERY | Age: 65
End: 2020-02-10
Payer: COMMERCIAL

## 2020-02-10 VITALS
WEIGHT: 232.6 LBS | TEMPERATURE: 97.6 F | DIASTOLIC BLOOD PRESSURE: 93 MMHG | BODY MASS INDEX: 33.3 KG/M2 | HEART RATE: 63 BPM | SYSTOLIC BLOOD PRESSURE: 162 MMHG | HEIGHT: 70 IN

## 2020-02-10 PROCEDURE — 99213 OFFICE O/P EST LOW 20 MIN: CPT | Performed by: ORTHOPAEDIC SURGERY

## 2020-02-11 ENCOUNTER — HOSPITAL ENCOUNTER (OUTPATIENT)
Dept: PHYSICAL THERAPY | Age: 65
Setting detail: THERAPIES SERIES
End: 2020-02-11

## 2020-02-13 ENCOUNTER — HOSPITAL ENCOUNTER (OUTPATIENT)
Dept: PHYSICAL THERAPY | Age: 65
Setting detail: THERAPIES SERIES
Discharge: HOME | End: 2020-02-13

## 2020-02-13 PROCEDURE — 9990000027 HC GAP GROUP

## 2020-02-13 NOTE — FLOWSHEET NOTE
Clinical Decision Rule for DVT on 10/21/19    Clinical Presentation  Possible Score  Clients Score    Active cancer (within 6 months of diagnosis or receiving palliative care)  1  0   Paralysis, paresis, or recent immobilization of lower extremity  1  0   Bedridden for more than 3 days or major surgery in the last 4 weeks  1  1   Localized tenderness in the center of the posterior calf, the popliteal space, or along the femoral vein in the anterior thigh/groin  1  0   Entire lower extremity swelling  1  0   Unilateral calf swelling (more than 3 cm larger than uninvolved side)  1  0   Unilateral pitting edema  1  0   Collateral superficial veins (nonvaricose)  1  0   An alternative diagnosis is as likely (or more likely) than DVT (e.g., cellulitis, postoperative swelling, calf strain)  -2  -2   Total Points   -1     Key  · -2 to 0 Low probability of DVT 3% (95% confidence interval [CI] 1.7%-5.9%)  · 1 to 2 Moderate probability of DVT 17% (95% confidence interval [CI] 12%-23%)  · 3 or more High probability of DVT 75% (95% confidence interval [CI] 63%-84%)    Medical consultation is advised in the presence of low probability  Medical referral is required with moderate or high score. Wells PS, Kingsley DR, Elsi J, et al: Value of assessment of pretest probability of deep-vein thrombosis in clinical management, Lancet 988:5956-8285, 1997.       RESTRICTIONS/PRECAUTIONS: HTN, history of colon cancer    Exercises/Interventions:     Exercise/Equipment Resistance/Repetitions Other comments   Stretching     Hamstring 3x:30    Hip Flexion     ITB     Grion     Quad 5x:30    Inclined Calf 5x:30    Towel Pull      SLR     Reclined 3x10 5#    Prone 3x10    Abduction 3x10 6#    Adducton 3x10 6#   SLR+ 5x:30   Clams    ABD +               Isometrics     Quad sets     Hip Adduction     Hamstring                    Patellar Glides    Medial    Superior    Inferior         ROM        With use of rope   Edge of bed towards functional goals listed. []  Progression is slowed due to complexities listed. []  Progression has been slowed due to co-morbidities. []  Plan just implemented, too soon to assess goals progression  []  Other:     ASSESSMENT:      Treatment/Activity Tolerance:  [x]  Patient tolerated treatment well []  Patient limited by fatique  []  Patient limited by pain  []  Patient limited by other medical complications  [x]  Other:    Pt having difficulty with ball taps keeping balance. He tolerated well re introduction of strength progressions. We have started to work on bilateral strength to prepare for right TKR in the future. Continue to use ice when needed and discussed use of compression stockings with long periods of standing when noticing swelling increase.       Prognosis: [x]  Good []  Fair  []  Poor    Patient Requires Follow-up: [x]  Yes  []  No    PLAN: Functional progressions   []  Continue per plan of care []  Alter current plan (see comments)  []  Plan of care initiated []  Hold pending MD visit [x]  Discharge    Electronically signed by:      Elvis Ashley DPT [id number]

## 2020-02-17 ENCOUNTER — HOSPITAL ENCOUNTER (OUTPATIENT)
Dept: PHYSICAL THERAPY | Age: 65
Setting detail: THERAPIES SERIES
Discharge: HOME | End: 2020-02-17

## 2020-02-17 PROCEDURE — 9990000027 HC GAP GROUP

## 2020-02-19 ENCOUNTER — HOSPITAL ENCOUNTER (OUTPATIENT)
Dept: PHYSICAL THERAPY | Age: 65
Setting detail: THERAPIES SERIES
Discharge: HOME | End: 2020-02-19

## 2020-02-19 PROCEDURE — 9990000027 HC GAP GROUP

## 2020-02-19 NOTE — FLOWSHEET NOTE
[address] [facility],[address], [facility] [address]       Physical Therapy Daily Treatment Note  Date:  2020    Patient Name:  Max Davis  \"Edgardo\"  :  1955  MRN: [id number]    Medical/Treatment Diagnosis Information:  · Diagnosis: M17.12 (ICD-10-CM) - Arthritis of left knee  · Treatment Diagnosis: M25.562 pain in left knee. Surgery on 2 Oct 1649  Insurance/Certification information:  PT Insurance Information: Medical [place]  Physician Information:  Referring Practitioner: Robbin Ball of care signed (Y/N): Y    Date of Patient follow up with Physician:     Functional Scale:  2019    WOMAC:7.3%       Latex Allergy:  [x]  NO      []  YES  Preferred Language for Healthcare:   [x]English       []other:    Visit # Insurance Allowable   21  3 BMN  Single visit 20     Pain level: 0/10    SUBJECTIVE:  Pt reports having no issues after LPV. Didn't have the initial soreness as he did last time. OBJECTIVE: 19    Observation:    Test measurements:      Flexibility L R Comment   Hamstring      Gastroc      ITB      Quad                ROM PROM AROM Overpressure Comment    L R L R L R    Flexion  120 sheet pulls.  121 wall slides  123 after streches      Extension   0                              Strength L R Comment   Quad 4+     Hamstring 4+     Gastroc      Hip flexor 4     Hip ABD 4+                     Special Test Results/Comment   Meniscal Click    Crepitus    Flexion Test    Valgus Laxity    Varus Laxity    Lachmans    Drop Back    Homans            Girth L R   Mid Patella     Suprapatellar     5cm above     15cm above       Wells Clinical Decision Rule for DVT on 10/21/19    Clinical Presentation  Possible Score  Clients Score    Active cancer (within 6 months of diagnosis or receiving palliative care)  1  0   Paralysis, paresis, or recent immobilization of lower extremity  1  0   Bedridden for more than 3 days or major surgery in the last 4 weeks  1  1   Localized tenderness in the center of the posterior calf, the popliteal space, or along the femoral vein in the anterior thigh/groin  1  0   Entire lower extremity swelling  1  0   Unilateral calf swelling (more than 3 cm larger than uninvolved side)  1  0   Unilateral pitting edema  1  0   Collateral superficial veins (nonvaricose)  1  0   An alternative diagnosis is as likely (or more likely) than DVT (e.g., cellulitis, postoperative swelling, calf strain)  -2  -2   Total Points   -1     Key  · -2 to 0 Low probability of DVT 3% (95% confidence interval [CI] 1.7%-5.9%)  · 1 to 2 Moderate probability of DVT 17% (95% confidence interval [CI] 12%-23%)  · 3 or more High probability of DVT 75% (95% confidence interval [CI] 63%-84%)    Medical consultation is advised in the presence of low probability  Medical referral is required with moderate or high score. Kristian PS, Kingsley DR, Elsi J, et al: Value of assessment of pretest probability of deep-vein thrombosis in clinical management, Lancet 420:7244-3486, 1997.       RESTRICTIONS/PRECAUTIONS: HTN, history of colon cancer    Exercises/Interventions:     Exercise/Equipment Resistance/Repetitions Other comments   Stretching     Hamstring 3x:30    Hip Flexion     ITB     Grion     Quad 5x:30    Inclined Calf 5x:30    Towel Pull      SLR           Abduction 4x10 pulses end of each 10       SLR+ ABCs x2 remi   Clams 10# 3x10 remi    ABD +               Isometrics     Quad sets     Hip Adduction     Hamstring                    Patellar Glides    Medial    Superior    Inferior         ROM        With use of rope   Edge of bed     Flexionator     Extensionator     Hang Weights     Ankle Pumps                              CKC     Calf raises 3x10 SL remi    Wall sits     Step ups     Step up and over    Lateral Step Downs              Mini squats 3x10 airex                 Side Planks     Half moon     Single leg flow     Square Dr. Patel Treatment/Activity Tolerance:  [x]  Patient tolerated treatment well []  Patient limited by fatique  []  Patient limited by pain  []  Patient limited by other medical complications  [x]  Other:    Pt doing well with strength progression. He noted difficulty with biodex balance machine today but was feeling more comfortable at end of reps. He noted the most weakness he feels is in the right hip due to always working on the left involved side but since we have started to prehab for his right knee he feels the strength is coming around. Prognosis: [x]  Good []  Fair  []  Poor    Patient Requires Follow-up: [x]  Yes  []  No     PLAN: Functional progressions   []  Continue per plan of care []  Alter current plan (see comments)  []  Plan of care initiated []  Hold pending MD visit [x]  Discharge    Electronically signed by:      Mickey BARILLAS   Tx was performed by [facility] as a part of the [facility] program following PT's recommendations/guidance.

## 2020-02-21 NOTE — PROGRESS NOTES
This dictation was done with [employer] dictation and may contain mechanical errors related to translation. Blood pressure (!) 162/93, pulse 63, temperature 97.6 °F (36.4 °C), temperature source Temporal, height 5' 10\" (1.778 m), weight 232 lb 9.6 oz (105.5 kg).
uncemented Daniel triathlon total knee arthroplasty. Stable overall orientation and alignment with no signs of obvious fractures or acute instability. Good patellofemoral mechanics are seen and no other obvious fractures tumors or dislocations are noted on these x-rays. Impression 40-year-old male stable status post total knee arthroplasty. Plan we had a 15-minute face-to-face discussion with this patient of which greater than 50% of time was spent on counseling about further care and treatment of his left total knee arthroplasty. We went over the need for continued strengthening and range of motion activities. We also discussed with him antibiotic prophylaxis around any dental or surgical events. We will follow him up within the next 6 to 8 months with new x-rays or PRN.

## 2020-03-02 ENCOUNTER — HOSPITAL ENCOUNTER (OUTPATIENT)
Dept: PHYSICAL THERAPY | Age: 65
Setting detail: THERAPIES SERIES
Discharge: HOME | End: 2020-03-02

## 2020-03-02 PROCEDURE — 9990000027 HC GAP GROUP

## 2020-03-02 NOTE — FLOWSHEET NOTE
paresis, or recent immobilization of lower extremity  1  0   Bedridden for more than 3 days or major surgery in the last 4 weeks  1  1   Localized tenderness in the center of the posterior calf, the popliteal space, or along the femoral vein in the anterior thigh/groin  1  0   Entire lower extremity swelling  1  0   Unilateral calf swelling (more than 3 cm larger than uninvolved side)  1  0   Unilateral pitting edema  1  0   Collateral superficial veins (nonvaricose)  1  0   An alternative diagnosis is as likely (or more likely) than DVT (e.g., cellulitis, postoperative swelling, calf strain)  -2  -2   Total Points   -1     Key  · -2 to 0 Low probability of DVT 3% (95% confidence interval [CI] 1.7%-5.9%)  · 1 to 2 Moderate probability of DVT 17% (95% confidence interval [CI] 12%-23%)  · 3 or more High probability of DVT 75% (95% confidence interval [CI] 63%-84%)    Medical consultation is advised in the presence of low probability  Medical referral is required with moderate or high score. Kristian PS, Kingsley PALMER, Elsi J, et al: Value of assessment of pretest probability of deep-vein thrombosis in clinical management, Lancet 188:8316-1109, 1997.       RESTRICTIONS/PRECAUTIONS: HTN, history of colon cancer    Exercises/Interventions:     Exercise/Equipment Resistance/Repetitions Other comments   Stretching     Hamstring 3x:30    Hip Flexion     ITB     Grion     Quad 5x:30    Inclined Calf 5x:30    Towel Pull      SLR                 SLR+ ABCs x2 remi   Clams 10# 3x10 remi    ABD +               Isometrics     Quad sets     Hip Adduction     Hamstring                    Patellar Glides    Medial    Superior    Inferior         ROM        With use of rope   Edge of bed     Flexionator     Extensionator     Hang Weights     Ankle Pumps                              CKC     Calf raises 3x10 SL remi    Wall sits     Step ups     Step up and over    Lateral Step Downs              Mini squats 3x10 airex

## 2020-03-04 ENCOUNTER — HOSPITAL ENCOUNTER (OUTPATIENT)
Dept: PHYSICAL THERAPY | Age: 65
Setting detail: THERAPIES SERIES
Discharge: HOME | End: 2020-03-04

## 2020-03-04 PROCEDURE — 9990000027 HC GAP GROUP

## 2020-03-04 NOTE — FLOWSHEET NOTE
[address] [facility],[address], [facility] [address]       Physical Therapy Daily Treatment Note  Date:  3/4/2020    Patient Name:  Nidia Forte  \"Edgardo\"  :  1955  MRN: [id number]    Medical/Treatment Diagnosis Information:  · Diagnosis: M17.12 (ICD-10-CM) - Arthritis of left knee  · Treatment Diagnosis: M25.562 pain in left knee. Surgery on 2 Oct 6769  Insurance/Certification information:  PT Insurance Information: Medical [place]  Physician Information:  Referring Practitioner: Wylie Dancer of care signed (Y/N): Y    Date of Patient follow up with Physician:     Functional Scale:  2019    WOMAC:7.3%       Latex Allergy:  [x]  NO      []  YES  Preferred Language for Healthcare:   [x]English       []other:    Visit # Insurance Allowable   21   5 BMN  Single visit 3/4/20     Pain level: 0/10    SUBJECTIVE:  Pt notes his knee is feeling good, hasnt had any issues with his hamstring feeling overly tight since LPV. OBJECTIVE: 19    Observation:    Test measurements:      Flexibility L R Comment   Hamstring      Gastroc      ITB      Quad                ROM PROM AROM Overpressure Comment    L R L R L R    Flexion  120 sheet pulls.  121 wall slides  123 after streches      Extension   0                              Strength L R Comment   Quad 4+     Hamstring 4+     Gastroc      Hip flexor 4     Hip ABD 4+                     Special Test Results/Comment   Meniscal Click    Crepitus    Flexion Test    Valgus Laxity    Varus Laxity    Lachmans    Drop Back    Homans            Girth L R   Mid Patella     Suprapatellar     5cm above     15cm above       Wells Clinical Decision Rule for DVT on 10/21/19    Clinical Presentation  Possible Score  Clients Score    Active cancer (within 6 months of diagnosis or receiving palliative care)  1  0   Paralysis, paresis, or recent immobilization of lower extremity  1  0   Bedridden for more than 3 days or major surgery in the last 4 weeks  1  1   Localized tenderness in the center of the posterior calf, the popliteal space, or along the femoral vein in the anterior thigh/groin  1  0   Entire lower extremity swelling  1  0   Unilateral calf swelling (more than 3 cm larger than uninvolved side)  1  0   Unilateral pitting edema  1  0   Collateral superficial veins (nonvaricose)  1  0   An alternative diagnosis is as likely (or more likely) than DVT (e.g., cellulitis, postoperative swelling, calf strain)  -2  -2   Total Points   -1     Key  · -2 to 0 Low probability of DVT 3% (95% confidence interval [CI] 1.7%-5.9%)  · 1 to 2 Moderate probability of DVT 17% (95% confidence interval [CI] 12%-23%)  · 3 or more High probability of DVT 75% (95% confidence interval [CI] 63%-84%)    Medical consultation is advised in the presence of low probability  Medical referral is required with moderate or high score. Kristian PS, Kingsley DR, Elsi J, et al: Value of assessment of pretest probability of deep-vein thrombosis in clinical management, Lancet 440:9017-8308, 1997.       RESTRICTIONS/PRECAUTIONS: HTN, history of colon cancer    Exercises/Interventions:     Exercise/Equipment Resistance/Repetitions Other comments   Stretching     Hamstring 3x:30    Hip Flexion     ITB     Grion     Quad 5x:30    Inclined Calf 5x:30    Towel Pull      SLR                       ABD +               Isometrics     Quad sets     Hip Adduction     Hamstring                    Patellar Glides    Medial    Superior    Inferior         ROM        With use of rope   Edge of bed     Flexionator     Extensionator     Hang Weights     Ankle Pumps                              CKC     Calf raises 3x10 SL remi    Wall sits [id number] w/ BS    Step ups     Step up and over    Lateral Step Downs              Mini squats 3x10 airex              Lateral band walks 3x red    Side Planks     Half moon     Single leg flow     Square walks Red tband x8 implemented, too soon to assess goals progression  []  Other:     ASSESSMENT:      Treatment/Activity Tolerance:  [x]  Patient tolerated treatment well []  Patient limited by fatique  []  Patient limited by pain  []  Patient limited by other medical complications  [x]  Other:   Patient was able to tolerate increase of balance tasks today. He noted that with wall sits he could feel the thighs working more especially with the ball squeeze. We discussed ice/heat as work starts to ramp up with baseball season especially after he is finished for the day. Prognosis: [x]  Good []  Fair  []  Poor    Patient Requires Follow-up: [x]  Yes  []  No     PLAN: Functional progressions   [x]  Continue per plan of care []  Alter current plan (see comments)  []  Plan of care initiated []  Hold pending MD visit []  Discharge     Electronically signed by:      Minnie BARILLAS   Tx was performed by ATC as a part of the [facility] program following PT's recommendations/guidance.

## 2020-03-09 ENCOUNTER — HOSPITAL ENCOUNTER (OUTPATIENT)
Dept: PHYSICAL THERAPY | Age: 65
Setting detail: THERAPIES SERIES
Discharge: HOME | End: 2020-03-09

## 2020-03-09 PROCEDURE — 9990000027 HC GAP GROUP

## 2020-03-09 NOTE — FLOWSHEET NOTE
medium level 7 3x1 min 87% 83%   Single leg stance 3x10 corner of table ball taps    Plyoback     Heel taps Level 2 3x10 remi    Stool scoots 3 laps    SB bridge 3x10 green SB    SB HS Curls     Planks           Rockerboard taps x40                   PRE     Extension 3x10 50# SL remi RANGE: 90/40   Flexion 3x10 45# SL remi RANGE: 0/90         Cable Column          Leg Press 3x10 130# SL remi 2 showing        Bike 10' L6            Cone walks                Modalities: 15' ice left knee      GOALS: Patient stated goal: be able to move around without pain      Therapist goals for Patient:   Short Term Goals: To be achieved in: 2 weeks  1. Pt will be independent HEP and progression per patient tolerance, in order to prevent re-injury. -met  2. Patient will have a decrease in pain of 3/10 to facilitate improvement in movement, function, and ADLs as indicated by functional deficits. -met     Long Term Goals: To be achieved in: 12 weeks  1. Pt will demo a WOMAC score of 40% or better to assist with reaching prior level of function. -met   2. Patient will demonstrate increased AROM to knee flexion to greater than or equal to 120 and knee ext to 0 to allow for proper joint functioning as indicated by patients functional deficits. -met  3. Patient will demonstrate an increase in strength of hip flex, ABD, and knee flex/ext to 4/5 to allow for proper functional mobility as indicated by patients functional deficits. -met  4. Patient will return to golf without pain.-ongoing due to weather. We did discuss how to do this. 5. Patient will be able to independently completely sit to stand without pain.-met      Progression Towards Functional goals:   [x]  Patient is progressing as expected towards functional goals listed. []  Progression is slowed due to complexities listed. []  Progression has been slowed due to co-morbidities.   []  Plan just implemented, too soon to assess goals progression  []  Other:     ASSESSMENT: Treatment/Activity Tolerance:  [x]  Patient tolerated treatment well []  Patient limited by fatique  []  Patient limited by pain  []  Patient limited by other medical complications  [x]  Other:   Patients balance is improving. He was able to tolerate ball taps at table and had higher scores on biodex machine. Quad strength continues to progress. Prognosis: [x]  Good []  Fair  []  Poor    Patient Requires Follow-up: [x]  Yes  []  No      PLAN: Functional progressions   [x]  Continue per plan of care []  Alter current plan (see comments)  []  Plan of care initiated []  Hold pending MD visit []  Discharge     Electronically signed by:      Christian BARILLAS   Tx was performed by ATC as a part of the [facility] program following PT's recommendations/guidance.

## 2020-03-11 ENCOUNTER — HOSPITAL ENCOUNTER (OUTPATIENT)
Dept: PHYSICAL THERAPY | Age: 65
Setting detail: THERAPIES SERIES
Discharge: HOME | End: 2020-03-11

## 2020-03-11 PROCEDURE — 9990000027 HC GAP GROUP

## 2020-03-11 NOTE — FLOWSHEET NOTE
Bedridden for more than 3 days or major surgery in the last 4 weeks  1  1   Localized tenderness in the center of the posterior calf, the popliteal space, or along the femoral vein in the anterior thigh/groin  1  0   Entire lower extremity swelling  1  0   Unilateral calf swelling (more than 3 cm larger than uninvolved side)  1  0   Unilateral pitting edema  1  0   Collateral superficial veins (nonvaricose)  1  0   An alternative diagnosis is as likely (or more likely) than DVT (e.g., cellulitis, postoperative swelling, calf strain)  -2  -2   Total Points   -1     Key  · -2 to 0 Low probability of DVT 3% (95% confidence interval [CI] 1.7%-5.9%)  · 1 to 2 Moderate probability of DVT 17% (95% confidence interval [CI] 12%-23%)  · 3 or more High probability of DVT 75% (95% confidence interval [CI] 63%-84%)    Medical consultation is advised in the presence of low probability  Medical referral is required with moderate or high score. Kristian PS, Kingsley DR, Elsi J, et al: Value of assessment of pretest probability of deep-vein thrombosis in clinical management, Lancet 267:8431-9052, 1997.       RESTRICTIONS/PRECAUTIONS: HTN, history of colon cancer    Exercises/Interventions:     Exercise/Equipment Resistance/Repetitions Other comments   Stretching     Hamstring 3x:30    Hip Flexion     ITB     Grion     Quad 5x:30    Inclined Calf 5x:30    Towel Pull      SLR                 SLR+ Seated 10x10\" remi       ABD +               Isometrics     Quad sets     Hip Adduction     Hamstring                    Patellar Glides    Medial    Superior    Inferior         ROM        With use of rope   Edge of bed     Flexionator     Extensionator     Hang Weights     Ankle Pumps                              CKC     Calf raises 3x10 SL remi    Wall sits [id number] w/ BS    Step ups     Step up and over    Lateral Step Downs                              Side Planks     Half moon     Single leg flow        Biodex-balance RC medium level 7 3x1 min 87% 83%      Plyoback 3x10 4# SL ball toss    Heel taps Level 2 3x10 remi       SB bridge 3x10 green SB    SB HS Curls     Planks           Rockerboard taps x10 taps w/ 20sec hold x3 rounds         Circuit RVL lateral walks, RVL fwd walks, stool scoot x1 lap each x2 rounds        PRE     Extension 3x10 45# SL remi RANGE: 90/40   Flexion 3x10 50# SL remi RANGE: 0/90         Cable Column          Leg Press 3x10 130# SL remi 2 showing        Bike 10' L6            Cone walks                Modalities: 15' ice left knee      GOALS: Patient stated goal: be able to move around without pain      Therapist goals for Patient:   Short Term Goals: To be achieved in: 2 weeks  1. Pt will be independent HEP and progression per patient tolerance, in order to prevent re-injury. -met  2. Patient will have a decrease in pain of 3/10 to facilitate improvement in movement, function, and ADLs as indicated by functional deficits. -met     Long Term Goals: To be achieved in: 12 weeks  1. Pt will demo a WOMAC score of 40% or better to assist with reaching prior level of function. -met   2. Patient will demonstrate increased AROM to knee flexion to greater than or equal to 120 and knee ext to 0 to allow for proper joint functioning as indicated by patients functional deficits. -met  3. Patient will demonstrate an increase in strength of hip flex, ABD, and knee flex/ext to 4/5 to allow for proper functional mobility as indicated by patients functional deficits. -met  4. Patient will return to golf without pain.-ongoing due to weather. We did discuss how to do this. 5. Patient will be able to independently completely sit to stand without pain.-met      Progression Towards Functional goals:   [x]  Patient is progressing as expected towards functional goals listed. []  Progression is slowed due to complexities listed. []  Progression has been slowed due to co-morbidities.   []  Plan just implemented, too soon to assess goals progression  []  Other:     ASSESSMENT:      Treatment/Activity Tolerance:  [x]  Patient tolerated treatment well []  Patient limited by fatique  []  Patient limited by pain  []  Patient limited by other medical complications  [x]  Other:   Patients strength continues to improve. He feels the fatigue takes longer to set in and his recovery between workouts is going well. Prognosis: [x]  Good []  Fair  []  Poor    Patient Requires Follow-up: [x]  Yes  []  No      PLAN: Functional progressions, balance endruance   [x]  Continue per plan of care []  Alter current plan (see comments)  []  Plan of care initiated []  Hold pending MD visit []  Discharge     Electronically signed by:      Geovanny Zuñiga ATC LAT   Tx was performed by LACIE as a part of the [facility] program following PT's recommendations/guidance.

## 2020-03-16 ENCOUNTER — HOSPITAL ENCOUNTER (OUTPATIENT)
Dept: PHYSICAL THERAPY | Age: 65
Setting detail: THERAPIES SERIES
Discharge: HOME | End: 2020-03-16

## 2020-03-16 PROCEDURE — 9990000027 HC GAP GROUP

## 2020-03-16 NOTE — FLOWSHEET NOTE
[address], [facility] [address]       Physical Therapy Daily Treatment Note  Date:  3/16/2020    Patient Name:  Erlin Mcdonald  \"Edgardo\"  :  1955  MRN: [id number]    Medical/Treatment Diagnosis Information:  · Diagnosis: M17.12 (ICD-10-CM) - Arthritis of left knee  · Treatment Diagnosis: M25.562 pain in left knee. Surgery on 2 Oct 9054  Insurance/Certification information:  PT Insurance Information: Medical [place]  Physician Information:  Referring Practitioner: Susan Hendrix of care signed (Y/N): Y    Date of Patient follow up with Physician:     Functional Scale:  2019    WOMAC:7.3%       Latex Allergy:  [x]  NO      []  YES  Preferred Language for Healthcare:   [x]English       []other:    Visit # Insurance Allowable   21    8 BMN  Single visit 3/16/20     Pain level: 0/10    SUBJECTIVE:  Pt notes he is feeling good. No issues with his knee. Patient did slip on stairs over the weekend and he feels fine. OBJECTIVE: 19    Observation:     Test measurements:      Flexibility L R Comment   Hamstring      Gastroc      ITB      Quad                ROM PROM AROM Overpressure Comment    L R L R L R    Flexion  120 sheet pulls.  121 wall slides  123 after streches      Extension   0                              Strength L R Comment   Quad 4+     Hamstring 4+     Gastroc      Hip flexor 4     Hip ABD 4+                     Special Test Results/Comment   Meniscal Click    Crepitus    Flexion Test    Valgus Laxity    Varus Laxity    Lachmans    Drop Back    Homans            Girth L R   Mid Patella     Suprapatellar     5cm above     15cm above       Wells Clinical Decision Rule for DVT on 10/21/19    Clinical Presentation  Possible Score  Clients Score    Active cancer (within 6 months of diagnosis or receiving palliative care)  1  0   Paralysis, paresis, or recent immobilization of lower extremity  1  0 to assess goals progression  []  Other:     ASSESSMENT:      Treatment/Activity Tolerance:  [x]  Patient tolerated treatment well []  Patient limited by fatique  []  Patient limited by pain  []  Patient limited by other medical complications  [x]  Other:   Patients feels strength and balance are getting better and he feels that he is starting to want to venture out on his own. He will have 1 more visit and we have discussed every other week or once a month until baseball season begins. Prognosis: [x]  Good []  Fair  []  Poor    Patient Requires Follow-up: [x]  Yes  []  No      PLAN: Functional progressions, balance endruance   [x]  Continue per plan of care []  Alter current plan (see comments)  []  Plan of care initiated []  Hold pending MD visit []  Discharge     Electronically signed by:      Earl Spain ATC LAT   Tx was performed by LACIE as a part of the [facility] program following PT's recommendations/guidance.

## 2020-03-18 ENCOUNTER — APPOINTMENT (OUTPATIENT)
Dept: PHYSICAL THERAPY | Age: 65
End: 2020-03-18

## 2020-03-23 ENCOUNTER — APPOINTMENT (OUTPATIENT)
Dept: PHYSICAL THERAPY | Age: 65
End: 2020-03-23

## 2020-03-25 ENCOUNTER — APPOINTMENT (OUTPATIENT)
Dept: PHYSICAL THERAPY | Age: 65
End: 2020-03-25

## 2020-10-05 ENCOUNTER — OFFICE VISIT (OUTPATIENT)
Dept: ORTHOPEDIC SURGERY | Age: 65
End: 2020-10-05
Payer: MEDICARE

## 2020-10-05 VITALS — BODY MASS INDEX: 33.21 KG/M2 | WEIGHT: 232 LBS | TEMPERATURE: 97.5 F | HEIGHT: 70 IN

## 2020-10-05 PROCEDURE — 99213 OFFICE O/P EST LOW 20 MIN: CPT | Performed by: ORTHOPAEDIC SURGERY

## 2020-10-05 NOTE — PROGRESS NOTES
[facility]  Office Visit    Chief Complaint: Follow-up s/p left total knee arthroplasty    HPI:  Oralia Olson is a 72 y.o. who is here in follow-up of left total knee arthroplasty, performed on October 2, 2019. He is doing very well. He is happy with the result. He walks without assistive device. Reports intermittent and occasional aches and pains that are tolerable. He also has occasional right knee pain due to known osteoarthritis. It is not bothering him much at this point. Patient Active Problem List   Diagnosis    Polyp of sigmoid colon    Colon cancer ([facility] [address]    Trigger middle finger of right hand    Primary osteoarthritis of right knee    Arthritis of left knee    History of total knee replacement, left       ROS:  Constitutional: denies fever, chills, weight loss  MSK: denies pain in other joints, muscle aches  Neurological: denies numbness, tingling, weakness    Medications:  Current Outpatient Medications on File Prior to Visit   Medication Sig Dispense Refill    ibuprofen (ADVIL;MOTRIN) 200 MG tablet Take 3 tablets by mouth every 8 hours as needed for Pain HOLD for 2 weeks post surgery 120 tablet 3    Cholecalciferol (VITAMIN D3) 2000 units CAPS Take 2,000 Units by mouth daily      chlorthalidone (HYGROTON) 25 MG tablet TK 1 T PO D  6    atenolol (TENORMIN) 50 MG tablet Take 100 mg by mouth daily   1    aspirin EC 81 MG EC tablet Take 1 tablet by mouth 2 times daily for 14 days Please avoid missing doses. 28 tablet 0     No current facility-administered medications on file prior to visit. Exam:  Temperature 97.5 °F (36.4 °C), temperature source Infrared, height 5' 10\" (1.778 m), weight 232 lb (105.2 kg).     Appearance: sitting in exam room chair, appears to be in no acute distress, awake and alert  Resp: unlabored breathing on room air  Skin: warm, dry and intact with out erythema or significant increased temperature  Neuro: grossly intact both lower extremities. Intact sensation to light touch. Motor exam 4+ to 5/5 in all major motor groups. MSK: left knee - anterior midline incision over knee healing as expected with no erythema, fluctuance, or drainage. No knee effusion. Active knee range of motion 0-130 degrees . Imaging:  3 views of the left knee were performed and reviewed today. Significant for total knee arthroplasty prosthesis in place with no signs of osteolysis, loosening, fracture, or dislocation. Assessment:  S/p left total knee arthroplasty    Plan:  He is doing well. He will continue activity as tolerated. We discussed antibiotic prophylaxis before dental procedures and he is set up for this. He will follow-up in 1 year or sooner if needed should his left knee bother him or his his right knee osteoarthritis flares up. This dictation was done with [employer] dictation and may contain mechanical errors related to translation.

## 2020-12-23 ENCOUNTER — OFFICE VISIT (OUTPATIENT)
Dept: PRIMARY CARE CLINIC | Age: 65
End: 2020-12-23
Payer: MEDICARE

## 2020-12-23 PROCEDURE — 99211 OFF/OP EST MAY X REQ PHY/QHP: CPT | Performed by: NURSE PRACTITIONER

## 2020-12-23 PROCEDURE — G8417 CALC BMI ABV UP PARAM F/U: HCPCS | Performed by: NURSE PRACTITIONER

## 2020-12-23 PROCEDURE — G8428 CUR MEDS NOT DOCUMENT: HCPCS | Performed by: NURSE PRACTITIONER

## 2020-12-23 NOTE — PROGRESS NOTES
Margoth Osorio received a viral test for COVID-19. They were educated on isolation and quarantine as appropriate. For any symptoms, they were directed to seek care from their PCP, given contact information to establish with a doctor, directed to an urgent care or the emergency room.

## 2020-12-24 LAB — SARS-COV-2, NAA: NOT DETECTED

## 2021-10-07 ENCOUNTER — OFFICE VISIT (OUTPATIENT)
Dept: ORTHOPEDIC SURGERY | Age: 66
End: 2021-10-07
Payer: MEDICARE

## 2021-10-07 VITALS — RESPIRATION RATE: 18 BRPM | BODY MASS INDEX: 32.21 KG/M2 | WEIGHT: 225 LBS | HEIGHT: 70 IN

## 2021-10-07 DIAGNOSIS — M17.11 ARTHRITIS OF RIGHT KNEE: ICD-10-CM

## 2021-10-07 DIAGNOSIS — Z96.652 HISTORY OF TOTAL KNEE REPLACEMENT, LEFT: Primary | ICD-10-CM

## 2021-10-07 PROCEDURE — 20610 DRAIN/INJ JOINT/BURSA W/O US: CPT | Performed by: PHYSICIAN ASSISTANT

## 2021-10-07 PROCEDURE — 99213 OFFICE O/P EST LOW 20 MIN: CPT | Performed by: PHYSICIAN ASSISTANT

## 2021-10-07 PROCEDURE — 1036F TOBACCO NON-USER: CPT | Performed by: PHYSICIAN ASSISTANT

## 2021-10-07 PROCEDURE — 1123F ACP DISCUSS/DSCN MKR DOCD: CPT | Performed by: PHYSICIAN ASSISTANT

## 2021-10-07 PROCEDURE — 3017F COLORECTAL CA SCREEN DOC REV: CPT | Performed by: PHYSICIAN ASSISTANT

## 2021-10-07 PROCEDURE — G8427 DOCREV CUR MEDS BY ELIG CLIN: HCPCS | Performed by: PHYSICIAN ASSISTANT

## 2021-10-07 PROCEDURE — G8417 CALC BMI ABV UP PARAM F/U: HCPCS | Performed by: PHYSICIAN ASSISTANT

## 2021-10-07 PROCEDURE — G8484 FLU IMMUNIZE NO ADMIN: HCPCS | Performed by: PHYSICIAN ASSISTANT

## 2021-10-07 PROCEDURE — 4040F PNEUMOC VAC/ADMIN/RCVD: CPT | Performed by: PHYSICIAN ASSISTANT

## 2021-10-07 NOTE — PROGRESS NOTES
This dictation was done with ividenceon dictation and may contain mechanical errors related to translation. I have today reviewed with Perla Pascual the clinically relevant, past medical history, medications, allergies, family history, social history, and Review Of Systems form the patients most recent history form & I have documented any details relevant to today's presenting complaints in my history below. Mr. Sera Huerta's self-reported past medical history, medications, allergies, family history, social history, and Review Of Systems form has been scanned into the chart under the \"Media\" tab. Subjective:  Perla Pascual is a 77 y.o. who is here complaining of pain in the right knee. He had a left total knee replacement done by Dr. Alyssa Garcia on 10/2/2019. Thank continues to do well. He works at the [employer] as a  and the prolonged standing his cough some medial joint line pain. We sent him for x-rays today including a standing AP lateral sunrise view of both left and the right knee. Patient Active Problem List   Diagnosis    Polyp of sigmoid colon    [facility] [address]    Trigger middle finger of right hand    Primary osteoarthritis of right knee    Arthritis of left knee    History of total knee replacement, left           Current Outpatient Medications on File Prior to Visit   Medication Sig Dispense Refill    ibuprofen (ADVIL;MOTRIN) 200 MG tablet Take 3 tablets by mouth every 8 hours as needed for Pain HOLD for 2 weeks post surgery 120 tablet 3    aspirin EC 81 MG EC tablet Take 1 tablet by mouth 2 times daily for 14 days Please avoid missing doses. 28 tablet 0    Cholecalciferol (VITAMIN D3) 2000 units CAPS Take 2,000 Units by mouth daily      chlorthalidone (HYGROTON) 25 MG tablet TK 1 T PO D  6    atenolol (TENORMIN) 50 MG tablet Take 100 mg by mouth daily   1     No current facility-administered medications on file prior to visit.          Objective: Resp. rate 18, height 5' 10\" (1.778 m), weight 225 lb (102.1 kg). On examination this pleasant 75-year-old gentleman in no acute distress she is alert and oriented x3 he is got good muscle tone good quad strength good hip flexion and abduction strength. He has good distal pulses good dorsiflexion and plantarflexion strength no varus or valgus laxity. Left knee has excellent alignment the right knee has varus alignment. He is negative for Sherwin on the right he has medial joint line tenderness and some crepitus during flexion extension through the patellofemoral joint. Left knee has a well-healed incision good smooth motion and good hamstring flexibility  Neuro exam grossly intact both lower extremities. Intact sensation to light touch. Motor exam 4+ to 5/5 in all major motor groups. Negative Woods's sign. Skin is warm, dry and intact with out erythema or significant increased temperature around the knee joint(s). There are no cutaneous lesions or lymphadenopathy present. X-RAYS:  X-rays taken the office today show excellent alignment sizing fit of the left knee prosthesis and the right knee has medial joint line osteoarthritis with the genu varus deformity      Assessment:  Stable left total knee replacement and right knee osteoarthritis    Plan:  During today's visit, there was approximately 25 minutes of face-to-face discussion in regards to the patient's current condition and treatment options. More than 50 % of the time was counseling and coordination of care as indicated above. At this point we talked short long-term expectations and he consented to a cortisone injection. I gave him literature in regards to hyaluronic acid. Hopefully the cortisone shot and home exercise program is out of her knee. PROCEDURE NOTE:  After a discussion of the multiple options, they consented to a cortisone shot.  1 ml of 40mg/ml Kenalog and 2 ml's of 0.25%Marcaine were injected into the right knee joint space.  The leg was slightly flexed and injected just lateral to the patella tendon to under the patella. This was done with sterile technique and they tolerated it well.           They will schedule a follow up in 3 to 4 weeks as needed

## 2021-10-15 NOTE — PROGRESS NOTES
[facility] time_____10/21/21 0930_______        Surgery time___1030_________    Take the following medications with a sip of water:    Do not eat or drink anything after 12:00 midnight prior to your surgery. This includes water chewing gum, mints and ice chips. You may brush your teeth and gargle the morning of your surgery, but do not swallow the water     Please see your family doctor/pediatrician for a history and physical and/or concerning medications. Bring any test results/reports from your physicians office. If you are under the care of a heart doctor or specialist doctor, please be aware that you may be asked to them for clearance    You may be asked to stop blood thinners such as Coumadin, Plavix, Fragmin, Lovenox, etc., or any anti-inflammatories such as:  Aspirin, Ibuprofen, Advil, Naproxen prior to your surgery. We also ask that you stop any OTC medications such as fish oil, vitamin E, glucosamine, garlic, Multivitamins, COQ 10, etc.    We ask that you do not smoke 24 hours prior to surgery  We ask that you do not  drink any alcoholic beverages 24 hours prior to surgery     You must make arrangements for a responsible adult to take you home after your surgery. For your safety you will not be allowed to leave alone or drive yourself home. Your surgery will be cancelled if you do not have a ride home. Also for your safety, it is strongly suggested that someone stay with you the first 24 hours after your surgery. A parent or legal guardian must accompany a child scheduled for surgery and plan to stay at the hospital until the child is discharged. Please do not bring other children with you. For your comfort, please wear simple loose fitting clothing to the hospital.  Please do not bring valuables.     Do not wear any make-up or nail polish on your fingers or toes      For your safety, please do not wear any jewelry or body piercing's on the day of surgery. All jewelry must be removed. If you have dentures, they will be removed before going to operating room. For your convenience, we will provide you with a container. If you wear contact lenses or glasses, they will be removed, please bring a case for them. If you have a living will and a durable power of  for healthcare, please bring in a copy. As part of our patient safety program to minimize surgical site infections, we ask you to do the following:    · Please notify your surgeon if you develop any illness between         now and the  day of your surgery. · This includes a cough, cold, fever, sore throat, nausea,         or vomiting, and diarrhea, etc.  ·  Please notify your surgeon if you experience dizziness, shortness         of breath or blurred vision between now and the time of your surgery. Do not shave your operative site 96 hours prior to surgery. For face and neck surgery, men may use an electric razor 48 hours   prior to surgery. You may shower the night before surgery or the morning of   your surgery with an antibacterial soap. You will need to bring a photo ID and insurance card    [facility] has an onsite pharmacy, would you like to utilize our pharmacy     If you will be staying overnight and use a C-pap machine, please bring   your C-pap to hospital     Our goal is to provide you with excellent care, therefore, visitors will be limited to two(2) in the room at a time so that we may focus on providing this care for you. Please contact pre-admission testing if you have any further questions. [facility] phone number:  [phone]  Please note these are generalized instructions for all surgical cases, you may be provided with more specific instructions according to your surgery.

## 2021-10-20 ENCOUNTER — ANESTHESIA EVENT (OUTPATIENT)
Dept: ENDOSCOPY | Age: 66
End: 2021-10-20
Payer: MEDICARE

## 2021-10-21 ENCOUNTER — ANESTHESIA (OUTPATIENT)
Dept: ENDOSCOPY | Age: 66
End: 2021-10-21
Payer: MEDICARE

## 2021-10-21 ENCOUNTER — HOSPITAL ENCOUNTER (OUTPATIENT)
Age: 66
Setting detail: OUTPATIENT SURGERY
Discharge: HOME | End: 2021-10-21
Attending: INTERNAL MEDICINE | Admitting: INTERNAL MEDICINE
Payer: MEDICARE

## 2021-10-21 VITALS — SYSTOLIC BLOOD PRESSURE: 110 MMHG | DIASTOLIC BLOOD PRESSURE: 74 MMHG | OXYGEN SATURATION: 98 %

## 2021-10-21 VITALS
BODY MASS INDEX: 27.92 KG/M2 | DIASTOLIC BLOOD PRESSURE: 63 MMHG | HEIGHT: 70 IN | OXYGEN SATURATION: 99 % | HEART RATE: 55 BPM | WEIGHT: 195 LBS | SYSTOLIC BLOOD PRESSURE: 114 MMHG | TEMPERATURE: 97.3 F | RESPIRATION RATE: 16 BRPM

## 2021-10-21 DIAGNOSIS — Z12.11 SCREEN FOR COLON CANCER: ICD-10-CM

## 2021-10-21 PROCEDURE — 7100000010 HC PHASE II RECOVERY - FIRST 15 MIN: Performed by: INTERNAL MEDICINE

## 2021-10-21 PROCEDURE — 2709999900 HC NON-CHARGEABLE SUPPLY: Performed by: INTERNAL MEDICINE

## 2021-10-21 PROCEDURE — 88305 TISSUE EXAM BY PATHOLOGIST: CPT

## 2021-10-21 PROCEDURE — 3609010600 HC COLONOSCOPY POLYPECTOMY SNARE/COLD BIOPSY: Performed by: INTERNAL MEDICINE

## 2021-10-21 PROCEDURE — 3700000000 HC ANESTHESIA ATTENDED CARE: Performed by: INTERNAL MEDICINE

## 2021-10-21 PROCEDURE — 6360000002 HC RX W HCPCS: Performed by: NURSE ANESTHETIST, CERTIFIED REGISTERED

## 2021-10-21 PROCEDURE — 2500000003 HC RX 250 WO HCPCS: Performed by: NURSE ANESTHETIST, CERTIFIED REGISTERED

## 2021-10-21 PROCEDURE — 7100000011 HC PHASE II RECOVERY - ADDTL 15 MIN: Performed by: INTERNAL MEDICINE

## 2021-10-21 PROCEDURE — 3700000001 HC ADD 15 MINUTES (ANESTHESIA): Performed by: INTERNAL MEDICINE

## 2021-10-21 PROCEDURE — 2580000003 HC RX 258: Performed by: ANESTHESIOLOGY

## 2021-10-21 RX ORDER — PROMETHAZINE HYDROCHLORIDE 25 MG/ML
6.25 INJECTION, SOLUTION INTRAMUSCULAR; INTRAVENOUS
Status: DISCONTINUED | OUTPATIENT
Start: 2021-10-21 | End: 2021-10-21 | Stop reason: HOSPADM

## 2021-10-21 RX ORDER — SODIUM CHLORIDE 0.9 % (FLUSH) 0.9 %
10 SYRINGE (ML) INJECTION EVERY 12 HOURS SCHEDULED
Status: DISCONTINUED | OUTPATIENT
Start: 2021-10-21 | End: 2021-10-21 | Stop reason: HOSPADM

## 2021-10-21 RX ORDER — PROPOFOL 10 MG/ML
INJECTION, EMULSION INTRAVENOUS PRN
Status: DISCONTINUED | OUTPATIENT
Start: 2021-10-21 | End: 2021-10-21 | Stop reason: SDUPTHER

## 2021-10-21 RX ORDER — ONDANSETRON 2 MG/ML
4 INJECTION INTRAMUSCULAR; INTRAVENOUS
Status: DISCONTINUED | OUTPATIENT
Start: 2021-10-21 | End: 2021-10-21 | Stop reason: HOSPADM

## 2021-10-21 RX ORDER — SODIUM CHLORIDE 9 MG/ML
INJECTION, SOLUTION INTRAVENOUS CONTINUOUS
Status: DISCONTINUED | OUTPATIENT
Start: 2021-10-21 | End: 2021-10-21 | Stop reason: HOSPADM

## 2021-10-21 RX ORDER — LIDOCAINE HYDROCHLORIDE 20 MG/ML
INJECTION, SOLUTION EPIDURAL; INFILTRATION; INTRACAUDAL; PERINEURAL PRN
Status: DISCONTINUED | OUTPATIENT
Start: 2021-10-21 | End: 2021-10-21 | Stop reason: SDUPTHER

## 2021-10-21 RX ORDER — SODIUM CHLORIDE 0.9 % (FLUSH) 0.9 %
10 SYRINGE (ML) INJECTION PRN
Status: DISCONTINUED | OUTPATIENT
Start: 2021-10-21 | End: 2021-10-21 | Stop reason: HOSPADM

## 2021-10-21 RX ORDER — LABETALOL HYDROCHLORIDE 5 MG/ML
5 INJECTION, SOLUTION INTRAVENOUS EVERY 10 MIN PRN
Status: DISCONTINUED | OUTPATIENT
Start: 2021-10-21 | End: 2021-10-21 | Stop reason: HOSPADM

## 2021-10-21 RX ORDER — SODIUM CHLORIDE 9 MG/ML
25 INJECTION, SOLUTION INTRAVENOUS PRN
Status: DISCONTINUED | OUTPATIENT
Start: 2021-10-21 | End: 2021-10-21 | Stop reason: HOSPADM

## 2021-10-21 RX ADMIN — LIDOCAINE HYDROCHLORIDE 50 MG: 20 INJECTION, SOLUTION EPIDURAL; INFILTRATION; INTRACAUDAL; PERINEURAL at 10:43

## 2021-10-21 RX ADMIN — PROPOFOL 80 MG: 10 INJECTION, EMULSION INTRAVENOUS at 10:43

## 2021-10-21 RX ADMIN — SODIUM CHLORIDE: 9 INJECTION, SOLUTION INTRAVENOUS at 10:31

## 2021-10-21 RX ADMIN — PROPOFOL 150 MCG/KG/MIN: 10 INJECTION, EMULSION INTRAVENOUS at 10:44

## 2021-10-21 ASSESSMENT — PAIN SCALES - GENERAL
PAINLEVEL_OUTOF10: 0
PAINLEVEL_OUTOF10: 0

## 2021-10-21 ASSESSMENT — PAIN - FUNCTIONAL ASSESSMENT: PAIN_FUNCTIONAL_ASSESSMENT: 0-10

## 2021-10-21 NOTE — ANESTHESIA POSTPROCEDURE EVALUATION
[employer] Department of Anesthesiology  Post-Anesthesia Note       Name:  Jessee Cochran                                  Age:  77 y.o. MRN:  [id number]     Last Vitals & Oxygen Saturation: /63   Pulse 55   Temp 97.3 °F (36.3 °C) (Temporal)   Resp 16   Ht 5' 10\" (1.778 m)   Wt 195 lb (88.5 kg)   SpO2 99%   BMI 27.98 kg/m²   Patient Vitals for the past 4 hrs:   BP Temp Temp src Pulse Resp SpO2 Height Weight   10/21/21 1113 114/63   55 16 99 %     10/21/21 1103 117/76 97.3 °F (36.3 °C) Temporal 50 16 97 %     10/21/21 1025 (!) 150/86 96.5 °F (35.8 °C) Temporal 61 16 100 % 5' 10\" (1.778 m) 195 lb (88.5 kg)       Level of consciousness:  Awake, alert    Respiratory: Respirations easy, no distress. Stable. Cardiovascular: Hemodynamically stable. Hydration: Adequate. PONV: Adequately managed. Post-op pain: Adequately controlled. Post-op assessment: Tolerated anesthetic well without complication. Complications:  None.     Spencer Celeste MD  October 21, 2021   11:33 AM

## 2021-10-21 NOTE — ANESTHESIA PRE PROCEDURE
[facility] Department of Anesthesiology  Pre-Anesthesia Evaluation/Consultation       Name:  Arron Tinoco  : 1955  Age:  77 y.o. MRN:  [id number]  Date: 10/21/2021           Surgeon: Surgeon(s):  Jessi Alcazar MD    Procedure: Procedure(s):  COLORECTAL CANCER SCREENING, NOT HIGH RISK     No Known Allergies  Patient Active Problem List   Diagnosis    Polyp of sigmoid colon    [facility] ([facility] [address]    Trigger middle finger of right hand    Primary osteoarthritis of right knee    Arthritis of left knee    History of total knee replacement, left     Past Medical History:   Diagnosis Date    Arthritis     Cancer ([facility] [address]     Colon    Hypertension      Past Surgical History:   Procedure Laterality Date    COLECTOMY  2012    COLONOSCOPY  2018    Dr. Emili Davalos ARTHROSCOPY Left     Meniscus Approx [address] Right     Approx 1969    TOTAL KNEE ARTHROPLASTY Left 10/2/2019    ROBOTIC ASSISTED LEFT TOTAL KNEE REPLACEMENT performed by [facility]elyn Leiva [facility] at [facility] History     Tobacco Use    Smoking status: Former Smoker    Smokeless tobacco: Never Used   Vaping Use    Vaping Use: Never used   Substance Use Topics    Alcohol use: Yes    Drug use: No     Medications  No current facility-administered medications on file prior to encounter.      Current Outpatient Medications on File Prior to Encounter   Medication Sig Dispense Refill    ibuprofen (ADVIL;MOTRIN) 200 MG tablet Take 3 tablets by mouth every 8 hours as needed for Pain HOLD for 2 weeks post surgery 120 tablet 3    Cholecalciferol (VITAMIN D3) 2000 units CAPS Take 2,000 Units by mouth daily      chlorthalidone (HYGROTON) 25 MG tablet TK 1 T PO D  6    atenolol (TENORMIN) 50 MG tablet Take 100 mg by mouth daily   1     Current Facility-Administered Medications   Medication Dose Route Frequency Provider Last Rate Last Admin    0.9 % sodium chloride infusion   IntraVENous Continuous Idania Patton MD        sodium chloride flush 0.9 % injection 10 mL  10 mL IntraVENous 2 times per day Arvil MD Pooja        sodium chloride flush 0.9 % injection 10 mL  10 mL IntraVENous PRN Arvil MD Pooja        0.9 % sodium chloride infusion  25 mL IntraVENous PRN Arvzoë Patton MD         Vital Signs (Current)   Vitals:    10/21/21 1025   BP: (!) 150/86   Pulse: 61   Resp: 16   Temp: 96.5 °F (35.8 °C)   SpO2: 100%     Vital Signs Statistics (for past 48 hrs)     Temp  Av.5 °F (35.8 °C)  Min: 96.5 °F (35.8 °C)   Min taken time: 10/21/21 1025  Max: 96.5 °F (35.8 °C)   Max taken time: 10/21/21 1025  Pulse  Av  Min: 61   Min taken time: 10/21/21 1025  Max: 61   Max taken time: 10/21/21 1025  Resp  Av  Min: 16   Min taken time: 10/21/21 1025  Max: 16   Max taken time: 10/21/21 1025  BP  Min: 150/86   Min taken time: 10/21/21 1025  Max: 150/86   Max taken time: 10/21/21 1025  SpO2  Av %  Min: 100 %   Min taken time: 10/21/21 1025  Max: 100 %   Max taken time: 10/21/21 1025    BP Readings from Last 3 Encounters:   10/21/21 (!) 150/86   02/10/20 (!) 162/93   10/03/19 126/81     BMI  Body mass index is 27.98 kg/m². Estimated body mass index is 27.98 kg/m² as calculated from the following:    Height as of this encounter: 5' 10\" (1.778 m). Weight as of this encounter: 195 lb (88.5 kg).     CBC   Lab Results   Component Value Date    WBC 13.3 2019    RBC 5.42 2019    HGB 14.2 10/03/2019    HCT 40.3 10/03/2019    MCV 84.9 2019    RDW 14.1 2019     2019     CMP    Lab Results   Component Value Date     2019    K 3.5 2019    K 3.7 2018    CL 97 2019    CO2 28 2019    BUN 19 2019    CREATININE 1.0 2019    GFRAA >60 2019    GFRAA >60 2012    AGRATIO 1.4 2018    LABGLOM >60 2019    GLUCOSE 119 2019    PROT 7.4 2018    CALCIUM 9.8 2019 BILITOT 0.6 11/01/2018    ALKPHOS 81 11/01/2018    AST 13 11/01/2018    ALT 17 11/01/2018     BMP    Lab Results   Component Value Date     09/25/2019    K 3.5 09/25/2019    K 3.7 07/16/2018    CL 97 09/25/2019    CO2 28 09/25/2019    BUN 19 09/25/2019    CREATININE 1.0 09/25/2019    CALCIUM 9.8 09/25/2019    GFRAA >60 09/25/2019    GFRAA >60 05/17/2012    LABGLOM >60 09/25/2019    GLUCOSE 119 09/25/2019     POCGlucose  No results for input(s): GLUCOSE in the last 72 hours. Coags    Lab Results   Component Value Date    PROTIME 12.0 09/25/2019    INR 1.05 09/25/2019    APTT 33.0 79/05/1461     HCG (If Applicable) No results found for: PREGTESTUR, PREGSERUM, HCG, HCGQUANT   ABGs No results found for: PHART, PO2ART, XTZ0IGX, FYH6JDE, BEART, Q0DBMCFQ   Type & Screen (If Applicable)  Lab Results   Component Value Date    LABABO A 05/10/2012    LABRH Positive 05/10/2012                            BMI: Wt Readings from Last 3 Encounters:       NPO Status: 8 hours                          Anesthesia Evaluation  Patient summary reviewed no history of anesthetic complications:   Airway: Mallampati: III  TM distance: >3 FB   Neck ROM: full   Dental: normal exam         Pulmonary:Negative Pulmonary ROS and normal exam                               Cardiovascular:  Exercise tolerance: good (>4 METS),   (+) hypertension:,       ECG reviewed  Rhythm: regular  Rate: normal           Beta Blocker:  Dose within 24 Hrs         Neuro/Psych:   Negative Neuro/Psych ROS              GI/Hepatic/Renal: Neg GI/Hepatic/Renal ROS  (+) bowel prep,      (-) GERD       Endo/Other: Negative Endo/Other ROS                    Abdominal:             Vascular: negative vascular ROS. Other Findings:             Anesthesia Plan      MAC     ASA 2       Induction: intravenous. Anesthetic plan and risks discussed with patient. Plan discussed with CRNA.               This pre-anesthesia assessment may be used as a history and physical.    DOS STAFF ADDENDUM:    Pt seen and examined, chart reviewed (including anesthesia, drug and allergy history). No interval changes to history and physical examination. Anesthetic plan, risks, benefits, alternatives, and personnel involved discussed with patient. Questions and concerns addressed. Patient(family) verbalized an understanding and agrees to proceed.       Nadira Bro MD  October 21, 2021  10:29 AM

## 2021-10-21 NOTE — OP NOTE
Colonoscopy Procedure Note      Patient: Solange Garnica  : 1955  Acct#:     Procedure: Colonoscopy with biopsy    Date:  10/21/2021    Surgeon:  Forrest Pacheco MD    Referring Physician:  Terry MIMS    Previous Colonoscopy: YES  Date:   Greater than 3 years: YES    Preoperative Diagnosis:  1. Surveillance/Hx of CRC () s/p Partial Colectomy/FH of CRC     Postoperative Diagnosis:  1. Ascending Colon Polyps 2. Tattoo at 70 cm 3. Healthy Anastomosis at 20 cm    Consent:  The patient or their legal guardian has signed a consent, and is aware of the potential risks, benefits, alternatives, and potential complications of this procedure. These include, but are not limited to hemorrhage, bleeding, post procedural pain, perforation, phlebitis, aspiration, hypotension, hypoxia, cardiovascular events such as arryhthmia, and possibly death. Additionally, the possibility of missed colonic polyps and interval colon cancer was discussed in the consent. Anesthesia: The patient was administered IV propofol per anesthesiology team.  Please see their operative records for full details. Procedure: An informed consent was obtained from the patient after explanation of indications, benefits, possible risks and complications of the procedure. The patient was then taken to the endoscopy suite, placed in the left lateral decubitus position, and the above IV anesthesia was administered. A digital rectal examination was performed and revealed negative without mass, lesions or tenderness. The Olympus video colonoscope was placed in the patient's rectum under digital direction and advanced to the cecum. The cecum was identified by characteristic anatomy and ballottment. The preparation was excellent. The ileocecal valve was identified. The scope was then withdrawn back through the cecum, ascending, transverse, descending, sigmoid colon, and rectum.   Careful circumferential examination of the mucosa in these areas demonstrated:    1. A 3 mm polyp in the ascending colon removed completely with biopsy polypectomy. 2. A tattoo was present at 70 cm without any evidence of residual polyp. 3. A healthy appearing anastomosis was present at 20 cm without any stricturing or ulceration. The scope was then withdrawn into the rectum and retroflexed. The retroflexed view of the anal verge and rectum demonstrates internal hemorrhoids. The scope was straightened, the colon was decompressed and the scope was withdrawn from the patient. The patient tolerated the procedure well and was taken to the PACU in good condition. Estimated Blood Loss (mL): < 5 CC     Complications: None    Specimens:   ID Type Source Tests Collected by Time Destination   A : A. bx ascending polyp Tissue Colon SURGICAL PATHOLOGY Andrade Arora MD 10/21/2021 1052      Impression:  See post-procedure diagnoses. Recommendations:  1. Await pathology results. 2. Recommend repeat Colonoscopy in 3 years for surveillance purposes. 3. The patient had biopsies taken today. The patient should call for results in 7 days if they have not heard from our office. Our number is [phone].     KARI Aguilar [address]  10/21/2021  [phone]

## 2021-10-21 NOTE — H&P
Pre-operative History and Physical    Patient: Alisha Douglass  : 1955  Acct#:     Intended Procedure:  Colonoscopy     HISTORY OF PRESENT ILLNESS:  The patient is a 77 y.o. male  who presents for/due to Surveillance/Hx of CRC () s/p Partial Colectomy/FH of CRC     Past Medical History:        Diagnosis Date    Arthritis     Cancer ([address] 2012    Colon    Hypertension      Past Surgical History:        Procedure Laterality Date    COLECTOMY      COLONOSCOPY  2018    Dr. Samy Dill ARTHROSCOPY Left     Meniscus Approx [address] Right     Approx 1969    TOTAL KNEE ARTHROPLASTY Left 10/2/2019    ROBOTIC ASSISTED LEFT TOTAL KNEE REPLACEMENT performed by Noble Romberg, [facility] at [facility]     Medications Prior to Admission:   Prior to Admission medications    Medication Sig Start Date End Date Taking? Authorizing Provider   ibuprofen (ADVIL;MOTRIN) 200 MG tablet Take 3 tablets by mouth every 8 hours as needed for Pain HOLD for 2 weeks post surgery 10/2/19  Yes Si , APRN - CNP   Cholecalciferol (VITAMIN D3) 2000 units CAPS Take 2,000 Units by mouth daily   Yes Historical Provider, MD   chlorthalidone (HYGROTON) 25 MG tablet TK 1 T PO D 17  Yes Historical Provider, MD   atenolol (TENORMIN) 50 MG tablet Take 100 mg by mouth daily  17  Yes Historical Provider, MD       Allergies:  Patient has no known allergies. Social History:   TOBACCO:   reports that he has quit smoking. He has never used smokeless tobacco.  ETOH:   reports current alcohol use. DRUGS:   reports no history of drug use. PHYSICAL EXAM:      Vital Signs: Ht 5' 10\" (1.778 m)   Wt 200 lb (90.7 kg)   BMI 28.70 kg/m²    Airway: No stridor or wheezing noted. Good air movement  Pulmonary: without wheezes.   Clear to auscultation  Cardiac:regular rate and rhythm without loud murmurs  Abdomen:soft, nontender,  Bowel sounds present    Pre-Procedure Assessment / Plan:  1) Colonoscopy     ASA Grade:  ASA 2 - Patient with mild systemic disease with no functional limitations  Mallampati Classification:  Class III    Level of Sedation Plan:Deep sedation    Post Procedure plan: Return to same level of care    I assessed the patient and find that the patient is in satisfactory condition to proceed with the planned procedure and sedation plan. I have explained the risk, benefits, and alternatives to the procedure; the patient understands and agrees to proceed.        Mak Cline MD  10/21/2021

## 2021-11-29 ENCOUNTER — TELEPHONE (OUTPATIENT)
Dept: ORTHOPEDIC SURGERY | Age: 66
End: 2021-11-29

## 2021-11-29 NOTE — TELEPHONE ENCOUNTER
[address] [place] [address]     patient is coming in for procedure tomorrow and wants to know if he needs pre meds

## 2021-12-15 ENCOUNTER — CLINICAL DOCUMENTATION (OUTPATIENT)
Dept: OTHER | Age: 66
End: 2021-12-15

## 2022-04-21 ENCOUNTER — TELEPHONE (OUTPATIENT)
Dept: ORTHOPEDIC SURGERY | Age: 67
End: 2022-04-21

## 2022-04-21 NOTE — TELEPHONE ENCOUNTER
Attempted to contact patient to inform them about the [facility] [id number] joint pain program.    To provide optimal care, [facility] [address], in collaboration with our Primary Care Providers, are excited to update our [facility] patients on our joint pain program.    In the past have you received injections in your knees? Or are you currently experiencing knee pain that is impacting your daily activities or quality of life? For more information about what [facility] [address] can offer to you or to set up an appointment with a joint pain specialist, please call us back at [phone].

## 2023-01-19 ENCOUNTER — TELEPHONE (OUTPATIENT)
Dept: ORTHOPEDIC SURGERY | Age: 68
End: 2023-01-19

## 2024-06-04 SDOH — HEALTH STABILITY: PHYSICAL HEALTH: ON AVERAGE, HOW MANY MINUTES DO YOU ENGAGE IN EXERCISE AT THIS LEVEL?: 150+ MIN

## 2024-06-04 SDOH — HEALTH STABILITY: PHYSICAL HEALTH: ON AVERAGE, HOW MANY DAYS PER WEEK DO YOU ENGAGE IN MODERATE TO STRENUOUS EXERCISE (LIKE A BRISK WALK)?: 6 DAYS

## 2024-06-05 ENCOUNTER — OFFICE VISIT (OUTPATIENT)
Dept: ORTHOPEDIC SURGERY | Age: 69
End: 2024-06-05
Payer: MEDICARE

## 2024-06-05 VITALS — WEIGHT: 201 LBS | BODY MASS INDEX: 28.77 KG/M2 | HEIGHT: 70 IN

## 2024-06-05 DIAGNOSIS — M75.82 ROTATOR CUFF TENDONITIS, LEFT: Primary | ICD-10-CM

## 2024-06-05 DIAGNOSIS — M19.012 ARTHRITIS OF LEFT ACROMIOCLAVICULAR JOINT: ICD-10-CM

## 2024-06-05 DIAGNOSIS — G89.29 CHRONIC LEFT SHOULDER PAIN: ICD-10-CM

## 2024-06-05 DIAGNOSIS — M25.512 CHRONIC LEFT SHOULDER PAIN: ICD-10-CM

## 2024-06-05 PROCEDURE — 99214 OFFICE O/P EST MOD 30 MIN: CPT | Performed by: ORTHOPAEDIC SURGERY

## 2024-06-05 PROCEDURE — 1123F ACP DISCUSS/DSCN MKR DOCD: CPT | Performed by: ORTHOPAEDIC SURGERY

## 2024-06-05 PROCEDURE — 3017F COLORECTAL CA SCREEN DOC REV: CPT | Performed by: ORTHOPAEDIC SURGERY

## 2024-06-05 PROCEDURE — 1036F TOBACCO NON-USER: CPT | Performed by: ORTHOPAEDIC SURGERY

## 2024-06-05 PROCEDURE — G8427 DOCREV CUR MEDS BY ELIG CLIN: HCPCS | Performed by: ORTHOPAEDIC SURGERY

## 2024-06-05 PROCEDURE — G8419 CALC BMI OUT NRM PARAM NOF/U: HCPCS | Performed by: ORTHOPAEDIC SURGERY

## 2024-06-05 RX ORDER — ASPIRIN 81 MG/1
81 TABLET, COATED ORAL DAILY
COMMUNITY

## 2024-06-05 RX ORDER — IBUPROFEN 600 MG/1
600 TABLET ORAL 2 TIMES DAILY WITH MEALS
Qty: 40 TABLET | Refills: 1 | Status: SHIPPED | OUTPATIENT
Start: 2024-06-05

## 2024-06-05 RX ORDER — METFORMIN HYDROCHLORIDE 500 MG/1
500 TABLET, EXTENDED RELEASE ORAL DAILY
COMMUNITY

## (undated) DEVICE — KIT TRK KNEE PROC VIZADISC

## (undated) DEVICE — COVER,MAYO STAND,STERILE: Brand: MEDLINE

## (undated) DEVICE — GLOVE,SURG,SENSICARE SLT,LF,PF,8.5: Brand: MEDLINE

## (undated) DEVICE — KIT OR ROOM TURNOVER W/STRAP

## (undated) DEVICE — 4-PORT MANIFOLD: Brand: NEPTUNE 2

## (undated) DEVICE — SOLUTION PREP POVIDONE IOD FOR SKIN MUCOUS MEM PRIOR TO

## (undated) DEVICE — PAD,NON-ADHERENT,3X8,STERILE,LF,1/PK: Brand: MEDLINE

## (undated) DEVICE — KIT INT FIX FEM TIB CKPT MAKOPLASTY

## (undated) DEVICE — STERILE TOTAL KNEE DRAPE PACK: Brand: CARDINAL HEALTH

## (undated) DEVICE — HANDPIECE SET WITH HIGH FLOW TIP AND SUCTION TUBE: Brand: INTERPULSE

## (undated) DEVICE — SYSTEM SKIN CLSR 22CM DERMBND PRINEO

## (undated) DEVICE — COVER LT HNDL BLU PLAS

## (undated) DEVICE — CHLORAPREP 26ML ORANGE

## (undated) DEVICE — ELECTRODE PT RET AD L9FT HI MOIST COND ADH HYDRGEL CORDED

## (undated) DEVICE — COVER,TABLE,77X90,STERILE: Brand: MEDLINE

## (undated) DEVICE — CUTTER SURG OD42MM PAT KNEE DISP FOR RM SYS XCELERATE

## (undated) DEVICE — HYPODERMIC SAFETY NEEDLE: Brand: MAGELLAN

## (undated) DEVICE — MATTRESS MAXI AIR TRNSF SGL PT USE DCS 37 45 48 52 75

## (undated) DEVICE — 3M™ COBAN™ NL STERILE NON-LATEX SELF-ADHERENT WRAP, 2083S, 3 IN X 5 YD (7,5 CM X 4,5 M), 24 ROLLS/CASE: Brand: 3M™ COBAN™

## (undated) DEVICE — BANDAGE COMPR W4INXL15FT BGE E SGL LAYERED CLP CLSR

## (undated) DEVICE — SOLUTION IV 1000ML 0.9% SOD CHL FOR IRRIG PLAS CONT

## (undated) DEVICE — BLADE SURG SAW STD S STL OSC W/ SERR EDGE DISP

## (undated) DEVICE — SUTURE STRATAFIX SPRL SZ 2 0 L14IN ABSRB UD MH L36MM 1 2 CIR SXMD2B401

## (undated) DEVICE — 1010 S-DRAPE TOWEL DRAPE 10/BX: Brand: STERI-DRAPE™

## (undated) DEVICE — SYRINGE MED 30ML STD CLR PLAS LUERLOCK TIP N CTRL DISP

## (undated) DEVICE — TOTAL BASIC PK

## (undated) DEVICE — PIN BNE FIX L110MM DIA32MM

## (undated) DEVICE — SHEET,DRAPE,53X77,STERILE: Brand: MEDLINE

## (undated) DEVICE — PIN BNE FIX TEMP L140MM DIA4MM MAKO

## (undated) DEVICE — GLOVE SURG SZ 85 L12IN FNGR THK13MIL WHT ISOLEX POLYISOPRENE

## (undated) DEVICE — BANDAGE COMPR W6INXL4.5YD LTWT E EC SGL LAYERED CLP CLSR

## (undated) DEVICE — KIT DRP FOR RIO ROBOTIC ARM ASST SYS

## (undated) DEVICE — SOLUTION IV IRRIG POUR BRL 0.9% SODIUM CHL 2F7124

## (undated) DEVICE — NEEDLE HYPO 18GA L1.5IN THN WALL PIVOTING SHLD BVL ORIENTED

## (undated) DEVICE — FORCEPS BX 240CM 2.4MM L NDL RAD JAW 4 M00513334

## (undated) DEVICE — GOWN,REINF,POLY,AURORA,XLNG/XXL,STRL: Brand: MEDLINE

## (undated) DEVICE — ZIMMER® STERILE DISPOSABLE TOURNIQUET CUFF WITH PLC, DUAL PORT, SINGLE BLADDER, 34 IN. (86 CM)

## (undated) DEVICE — DECANTER BAG 9": Brand: MEDLINE INDUSTRIES, INC.

## (undated) DEVICE — BANDAGE COMPR W6INXL12FT SMOOTH FOR LIMB EXSANG ESMARCH

## (undated) DEVICE — Z DISCONTINUED USE 2368412 KIT POS LEG DISP

## (undated) DEVICE — SUTURE ABSORBABLE MONOFILAMENT 1-0 OS8 14 IN STRATAFIX SPRL SXPD2B202

## (undated) DEVICE — SUTURE VCRL SZ 1 L18IN ABSRB UD L36MM CT-1 1/2 CIR J841D

## (undated) DEVICE — COTTON UNDERCAST PADDING,CRIMPED FINISH: Brand: WEBRIL

## (undated) DEVICE — GLOVE,SURG,SENSICARE SLT,LF,PF,8: Brand: MEDLINE

## (undated) DEVICE — Z DISCONTINUED USE 2716304 SUTURE STRATAFIX SPRL SZ 3-0 L12IN ABSRB UD FS-1 L24MM 3/8

## (undated) DEVICE — CORD RETRCT SIL

## (undated) DEVICE — SYRINGE MED 3ML CLR PLAS STD N CTRL LUERLOCK TIP DISP